# Patient Record
Sex: MALE | Race: BLACK OR AFRICAN AMERICAN | NOT HISPANIC OR LATINO
[De-identification: names, ages, dates, MRNs, and addresses within clinical notes are randomized per-mention and may not be internally consistent; named-entity substitution may affect disease eponyms.]

---

## 2017-05-24 PROBLEM — Z00.00 ENCOUNTER FOR PREVENTIVE HEALTH EXAMINATION: Status: ACTIVE | Noted: 2017-05-24

## 2017-06-26 ENCOUNTER — APPOINTMENT (OUTPATIENT)
Dept: UROLOGY | Facility: CLINIC | Age: 77
End: 2017-06-26

## 2021-01-09 ENCOUNTER — INPATIENT (INPATIENT)
Facility: HOSPITAL | Age: 81
LOS: 1 days | Discharge: HOME | DRG: 645 | End: 2021-01-11
Attending: HOSPITALIST | Admitting: HOSPITALIST
Payer: COMMERCIAL

## 2021-01-09 VITALS
WEIGHT: 160.06 LBS | RESPIRATION RATE: 17 BRPM | SYSTOLIC BLOOD PRESSURE: 166 MMHG | HEIGHT: 72 IN | TEMPERATURE: 98 F | OXYGEN SATURATION: 99 % | HEART RATE: 99 BPM | DIASTOLIC BLOOD PRESSURE: 80 MMHG

## 2021-01-09 DIAGNOSIS — E83.52 HYPERCALCEMIA: ICD-10-CM

## 2021-01-09 LAB
ALBUMIN SERPL ELPH-MCNC: 4 G/DL — SIGNIFICANT CHANGE UP (ref 3.3–5.2)
ALP SERPL-CCNC: 103 U/L — SIGNIFICANT CHANGE UP (ref 40–120)
ALT FLD-CCNC: 13 U/L — SIGNIFICANT CHANGE UP
ANION GAP SERPL CALC-SCNC: 10 MMOL/L — SIGNIFICANT CHANGE UP (ref 5–17)
ANION GAP SERPL CALC-SCNC: 9 MMOL/L — SIGNIFICANT CHANGE UP (ref 5–17)
AST SERPL-CCNC: 24 U/L — SIGNIFICANT CHANGE UP
BASOPHILS # BLD AUTO: 0.05 K/UL — SIGNIFICANT CHANGE UP (ref 0–0.2)
BASOPHILS NFR BLD AUTO: 0.7 % — SIGNIFICANT CHANGE UP (ref 0–2)
BILIRUB SERPL-MCNC: 0.5 MG/DL — SIGNIFICANT CHANGE UP (ref 0.4–2)
BUN SERPL-MCNC: 10 MG/DL — SIGNIFICANT CHANGE UP (ref 8–20)
BUN SERPL-MCNC: 8 MG/DL — SIGNIFICANT CHANGE UP (ref 8–20)
CA-I BLD-SCNC: 1.65 MMOL/L — HIGH (ref 1.15–1.33)
CALCIUM SERPL-MCNC: 11.5 MG/DL — HIGH (ref 8.6–10.2)
CALCIUM SERPL-MCNC: 12.9 MG/DL — HIGH (ref 8.6–10.2)
CHLORIDE SERPL-SCNC: 103 MMOL/L — SIGNIFICANT CHANGE UP (ref 98–107)
CHLORIDE SERPL-SCNC: 97 MMOL/L — LOW (ref 98–107)
CO2 SERPL-SCNC: 25 MMOL/L — SIGNIFICANT CHANGE UP (ref 22–29)
CO2 SERPL-SCNC: 26 MMOL/L — SIGNIFICANT CHANGE UP (ref 22–29)
CREAT SERPL-MCNC: 0.64 MG/DL — SIGNIFICANT CHANGE UP (ref 0.5–1.3)
CREAT SERPL-MCNC: 0.78 MG/DL — SIGNIFICANT CHANGE UP (ref 0.5–1.3)
EOSINOPHIL # BLD AUTO: 0.06 K/UL — SIGNIFICANT CHANGE UP (ref 0–0.5)
EOSINOPHIL NFR BLD AUTO: 0.8 % — SIGNIFICANT CHANGE UP (ref 0–6)
GLUCOSE SERPL-MCNC: 121 MG/DL — HIGH (ref 70–99)
GLUCOSE SERPL-MCNC: 79 MG/DL — SIGNIFICANT CHANGE UP (ref 70–99)
HCT VFR BLD CALC: 40.9 % — SIGNIFICANT CHANGE UP (ref 39–50)
HGB BLD-MCNC: 13.9 G/DL — SIGNIFICANT CHANGE UP (ref 13–17)
IMM GRANULOCYTES NFR BLD AUTO: 0.3 % — SIGNIFICANT CHANGE UP (ref 0–1.5)
LYMPHOCYTES # BLD AUTO: 2.41 K/UL — SIGNIFICANT CHANGE UP (ref 1–3.3)
LYMPHOCYTES # BLD AUTO: 31.5 % — SIGNIFICANT CHANGE UP (ref 13–44)
MAGNESIUM SERPL-MCNC: 1.4 MG/DL — LOW (ref 1.6–2.6)
MCHC RBC-ENTMCNC: 31 PG — SIGNIFICANT CHANGE UP (ref 27–34)
MCHC RBC-ENTMCNC: 34 GM/DL — SIGNIFICANT CHANGE UP (ref 32–36)
MCV RBC AUTO: 91.1 FL — SIGNIFICANT CHANGE UP (ref 80–100)
MONOCYTES # BLD AUTO: 0.69 K/UL — SIGNIFICANT CHANGE UP (ref 0–0.9)
MONOCYTES NFR BLD AUTO: 9 % — SIGNIFICANT CHANGE UP (ref 2–14)
NEUTROPHILS # BLD AUTO: 4.41 K/UL — SIGNIFICANT CHANGE UP (ref 1.8–7.4)
NEUTROPHILS NFR BLD AUTO: 57.7 % — SIGNIFICANT CHANGE UP (ref 43–77)
PHOSPHATE SERPL-MCNC: 1.7 MG/DL — LOW (ref 2.4–4.7)
PLATELET # BLD AUTO: 275 K/UL — SIGNIFICANT CHANGE UP (ref 150–400)
POTASSIUM SERPL-MCNC: 3.6 MMOL/L — SIGNIFICANT CHANGE UP (ref 3.5–5.3)
POTASSIUM SERPL-MCNC: 4 MMOL/L — SIGNIFICANT CHANGE UP (ref 3.5–5.3)
POTASSIUM SERPL-SCNC: 3.6 MMOL/L — SIGNIFICANT CHANGE UP (ref 3.5–5.3)
POTASSIUM SERPL-SCNC: 4 MMOL/L — SIGNIFICANT CHANGE UP (ref 3.5–5.3)
PROT SERPL-MCNC: 7.2 G/DL — SIGNIFICANT CHANGE UP (ref 6.6–8.7)
RBC # BLD: 4.49 M/UL — SIGNIFICANT CHANGE UP (ref 4.2–5.8)
RBC # FLD: 12.7 % — SIGNIFICANT CHANGE UP (ref 10.3–14.5)
SARS-COV-2 RNA SPEC QL NAA+PROBE: SIGNIFICANT CHANGE UP
SODIUM SERPL-SCNC: 131 MMOL/L — LOW (ref 135–145)
SODIUM SERPL-SCNC: 137 MMOL/L — SIGNIFICANT CHANGE UP (ref 135–145)
WBC # BLD: 7.64 K/UL — SIGNIFICANT CHANGE UP (ref 3.8–10.5)
WBC # FLD AUTO: 7.64 K/UL — SIGNIFICANT CHANGE UP (ref 3.8–10.5)

## 2021-01-09 PROCEDURE — 93010 ELECTROCARDIOGRAM REPORT: CPT

## 2021-01-09 PROCEDURE — 99222 1ST HOSP IP/OBS MODERATE 55: CPT

## 2021-01-09 PROCEDURE — 71045 X-RAY EXAM CHEST 1 VIEW: CPT | Mod: 26

## 2021-01-09 PROCEDURE — 99285 EMERGENCY DEPT VISIT HI MDM: CPT

## 2021-01-09 PROCEDURE — 99223 1ST HOSP IP/OBS HIGH 75: CPT

## 2021-01-09 RX ORDER — CALCITONIN SALMON 200 [IU]/ML
290 INJECTION, SOLUTION INTRAMUSCULAR EVERY 12 HOURS
Refills: 0 | Status: COMPLETED | OUTPATIENT
Start: 2021-01-09 | End: 2021-01-11

## 2021-01-09 RX ORDER — ENOXAPARIN SODIUM 100 MG/ML
40 INJECTION SUBCUTANEOUS DAILY
Refills: 0 | Status: DISCONTINUED | OUTPATIENT
Start: 2021-01-09 | End: 2021-01-11

## 2021-01-09 RX ORDER — MAGNESIUM SULFATE 500 MG/ML
2 VIAL (ML) INJECTION ONCE
Refills: 0 | Status: COMPLETED | OUTPATIENT
Start: 2021-01-09 | End: 2021-01-09

## 2021-01-09 RX ORDER — LOSARTAN POTASSIUM 100 MG/1
50 TABLET, FILM COATED ORAL DAILY
Refills: 0 | Status: DISCONTINUED | OUTPATIENT
Start: 2021-01-09 | End: 2021-01-11

## 2021-01-09 RX ORDER — SODIUM CHLORIDE 9 MG/ML
1000 INJECTION INTRAMUSCULAR; INTRAVENOUS; SUBCUTANEOUS
Refills: 0 | Status: DISCONTINUED | OUTPATIENT
Start: 2021-01-09 | End: 2021-01-11

## 2021-01-09 RX ORDER — CINACALCET 30 MG/1
30 TABLET, FILM COATED ORAL DAILY
Refills: 0 | Status: DISCONTINUED | OUTPATIENT
Start: 2021-01-09 | End: 2021-01-11

## 2021-01-09 RX ORDER — SIMVASTATIN 20 MG/1
20 TABLET, FILM COATED ORAL AT BEDTIME
Refills: 0 | Status: DISCONTINUED | OUTPATIENT
Start: 2021-01-09 | End: 2021-01-11

## 2021-01-09 RX ORDER — SODIUM CHLORIDE 9 MG/ML
1000 INJECTION INTRAMUSCULAR; INTRAVENOUS; SUBCUTANEOUS ONCE
Refills: 0 | Status: COMPLETED | OUTPATIENT
Start: 2021-01-09 | End: 2021-01-09

## 2021-01-09 RX ORDER — CINACALCET 30 MG/1
30 TABLET, FILM COATED ORAL ONCE
Refills: 0 | Status: COMPLETED | OUTPATIENT
Start: 2021-01-09 | End: 2021-01-09

## 2021-01-09 RX ORDER — AMLODIPINE BESYLATE 2.5 MG/1
5 TABLET ORAL DAILY
Refills: 0 | Status: DISCONTINUED | OUTPATIENT
Start: 2021-01-09 | End: 2021-01-11

## 2021-01-09 RX ADMIN — LOSARTAN POTASSIUM 50 MILLIGRAM(S): 100 TABLET, FILM COATED ORAL at 23:08

## 2021-01-09 RX ADMIN — Medication 50 GRAM(S): at 23:07

## 2021-01-09 RX ADMIN — SODIUM CHLORIDE 100 MILLILITER(S): 9 INJECTION INTRAMUSCULAR; INTRAVENOUS; SUBCUTANEOUS at 19:04

## 2021-01-09 RX ADMIN — SODIUM CHLORIDE 1000 MILLILITER(S): 9 INJECTION INTRAMUSCULAR; INTRAVENOUS; SUBCUTANEOUS at 18:44

## 2021-01-09 RX ADMIN — CINACALCET 30 MILLIGRAM(S): 30 TABLET, FILM COATED ORAL at 18:57

## 2021-01-09 RX ADMIN — CALCITONIN SALMON 290 INTERNATIONAL UNIT(S): 200 INJECTION, SOLUTION INTRAMUSCULAR at 18:56

## 2021-01-09 NOTE — H&P ADULT - NSICDXPASTSURGICALHX_GEN_ALL_CORE_FT
PAST SURGICAL HISTORY:  Benign Lipomatous Tumor right shoulder    S/P Inguinal Hernia Repair right    S/P Tonsillectomy

## 2021-01-09 NOTE — H&P ADULT - HISTORY OF PRESENT ILLNESS
79yo M with  pmh of HTN, HLD sent in from his endocrinologist office for hypercalcemia 13.2 after having routine labs done. pt states he had elevated calcium for the past 3 years and takes cinacalcet 30 mg qd at home that was just recently started.  Had recent DXA scan that he report is normal. he does report to 10-12lb weight loss over past 8weeks which he contribute to diet modification. Denies bone pain, hx of bone fractures, family hx of hypercal. No cp, sob, palpitations, fever, chills, n/v, abdominal pian. In the Ed labs pertinent for calcium of 12.9, evaluated by Endocrinologist, started on calcitonin IM, s/p 1L NS and cinacalcet 30mg x 1 dose.

## 2021-01-09 NOTE — CONSULT NOTE ADULT - SUBJECTIVE AND OBJECTIVE BOX
HPI:      PAST MEDICAL & SURGICAL HISTORY:  Hypertension  Hyperlipidemia  S/P Tonsillectomy  S/P Inguinal Hernia Repair  right    Benign Lipomatous Tumor  right shoulder    FAMILY HISTORY:    SOCIAL HISTORY:    REVIEW OF SYSTEMS:  Constitutional: No fever, no chills, no change in weight  Eyes: No eye swelling,no  blurry vision, no redness, no loss of vision.  Neck: No neck pain, no change in voice.  Lungs: No shortness of breath, no wheezing, no cough  CV: No chest pain, no palpitations, no pain with walking.  GI: No nausea, no vomiting, no constipation, no diarrhea, no abdominal pain  : No urinary frequency, no blood in urine  Musculoskeletal: No muscle pain, no joint pain, no swelling.  Skin: No rash  Neurologic: No headaches, no weakness  Endocrine: No heat intolerance, no cold intolerance  Psych: No depression, no anxiety    MEDICATIONS  (STANDING):  sodium chloride 0.9% Bolus 1000 milliLiter(s) IV Bolus once    Allergies  No Known Allergies    PHYSICAL EXAM:  Vital Signs Last 24 Hrs  T(C): 36.6 (09 Jan 2021 13:15), Max: 36.6 (09 Jan 2021 13:15)  T(F): 97.9 (09 Jan 2021 13:15), Max: 97.9 (09 Jan 2021 13:15)  HR: 99 (09 Jan 2021 13:15) (99 - 99)  BP: 166/80 (09 Jan 2021 13:15) (166/80 - 166/80)  BP(mean): --  RR: 17 (09 Jan 2021 13:15) (17 - 17)  SpO2: 99% (09 Jan 2021 13:15) (99% - 99%)    General appearance: Well developed, well nourished.  Eyes: Pupils equal and reactive to light. EOM full. No exophthalmos.  Neck: Trachea midline. No thyroid enlargement.  Lungs: Normal respiratory excursion. Lungs clear.  CV: Regular cardiac rhythm. No murmur. Carotid and pedal pulses intact.  Abdomen: Soft, non tender, no organomegaly or mass.  Musculoskeletal: No cyanosis, clubbing, or edema. No pedal lesions.  Skin: Warm and moist. No rash. No acanthosis.  Neuro: Cranial nerves intact. Normal motor and sensory function. DTR's normal.  Psych: Normal affect, good judgement.    LABS:                        13.9   7.64  )-----------( 275      ( 09 Jan 2021 15:08 )             40.9     01-09    131<L>  |  97<L>  |  10.0  ----------------------------<  79  3.6   |  25.0  |  0.78    Ca    12.9<H>      09 Jan 2021 15:08  Mg     1.4     01-09    TPro  7.2  /  Alb  4.0  /  TBili  0.5  /  DBili  x   /  AST  24  /  ALT  13  /  AlkPhos  103  01-09      LIVER FUNCTIONS - ( 09 Jan 2021 15:08 )  Alb: 4.0 g/dL / Pro: 7.2 g/dL / ALK PHOS: 103 U/L / ALT: 13 U/L / AST: 24 U/L / GGT: x              HPI: PT 80 with PMhs of HTN and HLD and primary hyperpTh comes for hypercalcemia 13.2 after having routine labs done at his endocrinologist's office. He came and received bolus 1  of NS .   He had high calcium for 3 years and takes cinacalcet 30 mg qd at home. Had recent DXA scan and he does not know result. No fracture no family h/o hyperCa, no depression. No MVi supplements      PAST MEDICAL & SURGICAL HISTORY:  Hypertension  Hyperlipidemia  S/P Tonsillectomy  S/P Inguinal Hernia Repair  right    Benign Lipomatous Tumor  right shoulder    FAMILY HISTORY: no high calcium     SOCIAL HISTORY: yumiko jamshid, no tobacco, no drugs     REVIEW OF SYSTEMS:  Constitutional: No fever, no chills, no change in weight  Eyes: No eye swelling,no  blurry vision, no redness, no loss of vision.  Neck: No neck pain, no change in voice.  Lungs: No shortness of breath, no wheezing, no cough  CV: No chest pain, no palpitations, no pain with walking.  GI: No nausea, no vomiting, no constipation, no diarrhea, no abdominal pain  : No urinary frequency, no blood in urine  Musculoskeletal: No muscle pain, no joint pain, no swelling.  Skin: No rash  Neurologic: No headaches, no weakness  Endocrine: No heat intolerance, no cold intolerance  Psych: No depression, no anxiety    MEDICATIONS  (STANDING):  sodium chloride 0.9% Bolus 1000 milliLiter(s) IV Bolus once    Allergies  No Known Allergies    PHYSICAL EXAM:  Vital Signs Last 24 Hrs  T(C): 36.6 (09 Jan 2021 13:15), Max: 36.6 (09 Jan 2021 13:15)  T(F): 97.9 (09 Jan 2021 13:15), Max: 97.9 (09 Jan 2021 13:15)  HR: 99 (09 Jan 2021 13:15) (99 - 99)  BP: 166/80 (09 Jan 2021 13:15) (166/80 - 166/80)  BP(mean): --  RR: 17 (09 Jan 2021 13:15) (17 - 17)  SpO2: 99% (09 Jan 2021 13:15) (99% - 99%)    General appearance: Well developed, well nourished.  Eyes: Pupils equal and reactive to light. EOM full. No exophthalmos.  Neck: Trachea midline. No thyroid enlargement.  Lungs: Normal respiratory excursion. Lungs clear.  CV: Regular cardiac rhythm. No murmur. Carotid and pedal pulses intact.  Abdomen: Soft, non tender, no organomegaly or mass.  Musculoskeletal: No cyanosis, clubbing, or edema. No pedal lesions.  Skin: Warm and moist. No rash. No acanthosis.  Neuro: Cranial nerves intact. Normal motor and sensory function. DTR's normal.  Psych: Normal affect, good judgement.    LABS:                        13.9   7.64  )-----------( 275      ( 09 Jan 2021 15:08 )             40.9     01-09    131<L>  |  97<L>  |  10.0  ----------------------------<  79  3.6   |  25.0  |  0.78    Ca    12.9<H>      09 Jan 2021 15:08  Mg     1.4     01-09    TPro  7.2  /  Alb  4.0  /  TBili  0.5  /  DBili  x   /  AST  24  /  ALT  13  /  AlkPhos  103  01-09      LIVER FUNCTIONS - ( 09 Jan 2021 15:08 )  Alb: 4.0 g/dL / Pro: 7.2 g/dL / ALK PHOS: 103 U/L / ALT: 13 U/L / AST: 24 U/L / GGT: x

## 2021-01-09 NOTE — H&P ADULT - NSHPPHYSICALEXAM_GEN_ALL_CORE
T(C): 36.9 (01-09-21 @ 20:41), Max: 36.9 (01-09-21 @ 20:41)  HR: 81 (01-09-21 @ 20:41) (81 - 99)  BP: 171/82 (01-09-21 @ 20:41) (166/80 - 180/81)  RR: 20 (01-09-21 @ 20:41) (17 - 20)  SpO2: 98% (01-09-21 @ 20:41) (98% - 99%)    GENERAL: patient appears well, no acute distress, appropriate, pleasant  EYES: sclera clear, no exudates  ENMT: oropharynx clear without erythema, no exudates, moist mucous membranes  NECK: supple, soft, no thyromegaly noted  LUNGS: good air entry bilaterally, clear to auscultation, symmetric breath sounds, no wheezing or rhonchi appreciated  HEART: soft S1/S2, regular rate and rhythm, no murmurs noted, no lower extremity edema  GASTROINTESTINAL: abdomen is soft, nontender, nondistended, normoactive bowel sounds, no palpable masses  INTEGUMENT: good skin turgor, warm skin, appears well perfused  MUSCULOSKELETAL: no clubbing or cyanosis, no obvious deformity  NEUROLOGIC: awake, alert, oriented x3, good muscle tone in 4 extremities, no obvious sensory deficits  PSYCHIATRIC: mood is good, affect is congruent, linear and logical thought process  HEME/LYMPH: no palpable supraclavicular nodules, no obvious ecchymosis or petechiae

## 2021-01-09 NOTE — ED PROVIDER NOTE - OBJECTIVE STATEMENT
Pt is an 81 yo M sent in for hypercalcemia.  Pt states that he has had hypercalcemia for approx. 3 years.  Pt states that he has had gradually worsening levels. Pt states that he had recent blood work and was called by his doctor and told to come to the ER as it was too high at 13.2.  Pt states that he has had some fatigue and a recent 10 lb weight loss. no n/v. no diarrhea. no fever.

## 2021-01-09 NOTE — CONSULT NOTE ADULT - ASSESSMENT
80 yop male sent by his PCP for high serum calcium from office 13.2, now repeated in ER 12.9   He received 1000 mL bolus NS in ER     Hypercalcemia:   check cmp, PTHi, 25OHD, 1.25 diHOD, P to evaluate if this is  PTh-driven / nonPTH driven hyperpTH.   avoid any calcium supplements, stop all MVI   start IV hydration  cc/hr as tolerated by cardiac status   start calcitonin for 2 days 4u /KG   avoid HCTZ   check calcium in 6 hr  80 yop male sent by his PCP for high serum calcium from office 13.2, now repeated in ER 12.9   He received 1000 mL bolus NS in ER     Hypercalcemia:   check cmp, PTHi, 25OHD, 1.25 diHOD, P to evaluate if this is  PTh-driven / nonPTH driven hyperpTH.   avoid any calcium supplements, stop all MVI   start IV hydration  cc/hr as tolerated by cardiac status   continue cinacalcet 30 mg qd.   start calcitonin for 2 days 4u /KG   avoid HCTZ   check calcium in 6 hr   he sees dr Pizarro as outpatient. He     HTN : continue with home medication. Stop HCTz if patient takes it     HLD: continue statin

## 2021-01-09 NOTE — ED STATDOCS - PROGRESS NOTE DETAILS
Tran GONZALEZ for ED attending, Dr. Villarreal. 79 y/o male with PMHx of Vitamin D deficiency, HLD and HTN presents to ED c/o abnormal lab result. Patient reports he was told he had an elevated calcium level of 13.2 by his PMD. 4 weeks ago, patient was referred to an endocrinologist for the hypercalcemia and was placed on Canacalcet. Patient is currently asymptomatic at this time. Patient also endorses about a 10lb weight loss over the past 4-6 weeks. PMD: Dr. Johnson ([place]). Focused eval, protocol orders entered. Pt to be moved to main ED for further evaluation by another provider.

## 2021-01-09 NOTE — ED ADULT NURSE NOTE - OBJECTIVE STATEMENT
Pt sent in for elevate calcium levels. Pt states each year that he gets a physical it is elevated. Offers no complaints.

## 2021-01-09 NOTE — ED PROVIDER NOTE - CLINICAL SUMMARY MEDICAL DECISION MAKING FREE TEXT BOX
labs and imaging reviewed.  patient seen by Dr. Mcneil and recommendations are in her note. will admit to medicine for further management.

## 2021-01-09 NOTE — ED ADULT NURSE NOTE - CHPI ED NUR SYMPTOMS NEG
no back pain/no chills/no decreased eating/drinking/no dizziness/no fever/no headache/no loss of consciousness/no nausea/no pain/no vomiting

## 2021-01-10 LAB
ANION GAP SERPL CALC-SCNC: 7 MMOL/L — SIGNIFICANT CHANGE UP (ref 5–17)
BUN SERPL-MCNC: 9 MG/DL — SIGNIFICANT CHANGE UP (ref 8–20)
CALCIUM SERPL-MCNC: 10.5 MG/DL — HIGH (ref 8.6–10.2)
CALCIUM SERPL-MCNC: 12.6 MG/DL — HIGH (ref 8.4–10.5)
CHLORIDE SERPL-SCNC: 103 MMOL/L — SIGNIFICANT CHANGE UP (ref 98–107)
CO2 SERPL-SCNC: 25 MMOL/L — SIGNIFICANT CHANGE UP (ref 22–29)
CREAT SERPL-MCNC: 0.65 MG/DL — SIGNIFICANT CHANGE UP (ref 0.5–1.3)
GLUCOSE SERPL-MCNC: 98 MG/DL — SIGNIFICANT CHANGE UP (ref 70–99)
POTASSIUM SERPL-MCNC: 3.8 MMOL/L — SIGNIFICANT CHANGE UP (ref 3.5–5.3)
POTASSIUM SERPL-SCNC: 3.8 MMOL/L — SIGNIFICANT CHANGE UP (ref 3.5–5.3)
PTH-INTACT FLD-MCNC: 194 PG/ML — HIGH (ref 15–65)
SARS-COV-2 IGG SERPL QL IA: NEGATIVE — SIGNIFICANT CHANGE UP
SARS-COV-2 IGM SERPL IA-ACNC: 0.07 INDEX — SIGNIFICANT CHANGE UP
SODIUM SERPL-SCNC: 135 MMOL/L — SIGNIFICANT CHANGE UP (ref 135–145)
VIT D25+D1,25 OH+D1,25 PNL SERPL-MCNC: 74.2 PG/ML — SIGNIFICANT CHANGE UP (ref 19.9–79.3)

## 2021-01-10 PROCEDURE — 99232 SBSQ HOSP IP/OBS MODERATE 35: CPT

## 2021-01-10 PROCEDURE — 76536 US EXAM OF HEAD AND NECK: CPT | Mod: 26

## 2021-01-10 PROCEDURE — 99233 SBSQ HOSP IP/OBS HIGH 50: CPT

## 2021-01-10 RX ORDER — SODIUM,POTASSIUM PHOSPHATES 278-250MG
1 POWDER IN PACKET (EA) ORAL
Refills: 0 | Status: DISCONTINUED | OUTPATIENT
Start: 2021-01-10 | End: 2021-01-11

## 2021-01-10 RX ADMIN — AMLODIPINE BESYLATE 5 MILLIGRAM(S): 2.5 TABLET ORAL at 04:59

## 2021-01-10 RX ADMIN — Medication 1 TABLET(S): at 18:27

## 2021-01-10 RX ADMIN — SODIUM CHLORIDE 100 MILLILITER(S): 9 INJECTION INTRAMUSCULAR; INTRAVENOUS; SUBCUTANEOUS at 23:04

## 2021-01-10 RX ADMIN — CINACALCET 30 MILLIGRAM(S): 30 TABLET, FILM COATED ORAL at 10:44

## 2021-01-10 RX ADMIN — SIMVASTATIN 20 MILLIGRAM(S): 20 TABLET, FILM COATED ORAL at 22:57

## 2021-01-10 RX ADMIN — ENOXAPARIN SODIUM 40 MILLIGRAM(S): 100 INJECTION SUBCUTANEOUS at 10:44

## 2021-01-10 RX ADMIN — CALCITONIN SALMON 290 INTERNATIONAL UNIT(S): 200 INJECTION, SOLUTION INTRAMUSCULAR at 18:27

## 2021-01-10 RX ADMIN — Medication 1 TABLET(S): at 22:57

## 2021-01-10 RX ADMIN — CALCITONIN SALMON 290 INTERNATIONAL UNIT(S): 200 INJECTION, SOLUTION INTRAMUSCULAR at 04:58

## 2021-01-10 NOTE — PROGRESS NOTE ADULT - SUBJECTIVE AND OBJECTIVE BOX
TAMAR CELESTE    [id number]    80y      Male    Patient is a 80y old  Male who presents with a chief complaint of Hypercalcemia (09 Jan 2021 21:04)      INTERVAL HPI/OVERNIGHT EVENTS:    Patient is doing well, he has no chest pain, sob, dizziness, fever, chills.     REVIEW OF SYSTEMS:    CONSTITUTIONAL: No fever, some fatigue  RESPIRATORY: No cough, No shortness of breath  CARDIOVASCULAR: No chest pain, palpitations  GASTROINTESTINAL: No abdominal, No nausea, vomiting  NEUROLOGICAL: No headaches,  loss of strength.  MISCELLANEOUS: No joint swelling or pain       Vital Signs Last 24 Hrs  T(C): 36.8 (10 Collin 2021 07:56), Max: 36.9 (09 Jan 2021 20:41)  T(F): 98.3 (10 Collin 2021 07:56), Max: 98.4 (09 Jan 2021 20:41)  HR: 80 (10 Collin 2021 07:56) (75 - 87)  BP: 148/62 (10 Collin 2021 07:56) (143/67 - 180/81)  RR: 20 (10 Collin 2021 07:56) (17 - 20)  SpO2: 99% (10 Collin 2021 07:56) (98% - 99%)    PHYSICAL EXAM:    GENERAL: Elderly  male looking comfortable   HEENT: PERRL  NECK: soft, Supple, No JVD,   CHEST/LUNG: Clear to auscultate bilaterally; No wheezing  HEART: S1S2+, Regular rate and rhythm; No murmurs  ABDOMEN: Soft, Nontender, Nondistended; Bowel sounds present  EXTREMITIES:  1+ Peripheral Pulses, No edema  SKIN: No rashes or lesions  NEURO: AAOX3, no focal deficits, no motor r sensory loss  PSYCH: normal mood      LABS:                        13.9   7.64  )-----------( 275      ( 09 Jan 2021 15:08 )             40.9     01-09    137  |  103  |  8.0  ----------------------------<  121<H>  4.0   |  26.0  |  0.64    Ca    11.5<H>      09 Jan 2021 23:31  Phos  1.7     01-09  Mg     1.4     01-09    TPro  7.2  /  Alb  4.0  /  TBili  0.5  /  DBili  x   /  AST  24  /  ALT  13  /  AlkPhos  103  01-09            I&O's Summary    09 Jan 2021 07:01  -  10 Collin 2021 07:00  --------------------------------------------------------  IN: 800 mL / OUT: 0 mL / NET: 800 mL        MEDICATIONS  (STANDING):  amLODIPine   Tablet 5 milliGRAM(s) Oral daily  calcitonin Injectable 290 International Unit(s) IntraMuscular every 12 hours  cinacalcet 30 milliGRAM(s) Oral daily  enoxaparin Injectable 40 milliGRAM(s) SubCutaneous daily  losartan 50 milliGRAM(s) Oral daily  potassium phosphate / sodium phosphate Tablet (K-PHOS No. 2) 1 Tablet(s) Oral four times a day with meals  simvastatin 20 milliGRAM(s) Oral at bedtime  sodium chloride 0.9%. 1000 milliLiter(s) (100 mL/Hr) IV Continuous <Continuous>    MEDICATIONS  (PRN):        
INTERVAL HPI/OVERNIGHT EVENTS:  followup for hyperCa    MEDICATIONS  (STANDING):  amLODIPine   Tablet 5 milliGRAM(s) Oral daily  calcitonin Injectable 290 International Unit(s) IntraMuscular every 12 hours  cinacalcet 30 milliGRAM(s) Oral daily  enoxaparin Injectable 40 milliGRAM(s) SubCutaneous daily  losartan 50 milliGRAM(s) Oral daily  potassium phosphate / sodium phosphate Tablet (K-PHOS No. 2) 1 Tablet(s) Oral four times a day with meals  simvastatin 20 milliGRAM(s) Oral at bedtime  sodium chloride 0.9%. 1000 milliLiter(s) (100 mL/Hr) IV Continuous <Continuous>    Allergies  No Known Allergies    Review of systems: no CP, no SOB , no HA , no N/V    Vital Signs Last 24 Hrs  T(C): 36.8 (10 Collin 2021 07:56), Max: 36.9 (09 Jan 2021 20:41)  T(F): 98.3 (10 Collin 2021 07:56), Max: 98.4 (09 Jan 2021 20:41)  HR: 80 (10 Collin 2021 07:56) (75 - 87)  BP: 148/62 (10 Collin 2021 07:56) (143/67 - 180/81)  BP(mean): --  RR: 20 (10 Collin 2021 07:56) (17 - 20)  SpO2: 99% (10 Collin 2021 07:56) (98% - 99%)    General appearance: Well developed, well nourished.  Eyes: Pupils equal and reactive to light. EOM full.   Neck: Trachea midline. No thyroid enlargement.  Lungs: Normal respiratory excursion. Lungs clear.  CV: Regular cardiac rhythm. No murmur. Carotid and pedal pulses intact.  Abdomen: Soft, non tender, no organomegaly or mass.  Musculoskeletal: No cyanosis, clubbing, or edema. No pedal lesions.  Skin: Warm and moist. No rash. No acanthosis.  Neuro: Cranial nerves intact. Normal motor and sensory function. DTR's normal.  Psych: Normal affect, good judgement.      LABS:                        13.9   7.64  )-----------( 275      ( 09 Jan 2021 15:08 )             40.9     01-09    137  |  103  |  8.0  ----------------------------<  121<H>  4.0   |  26.0  |  0.64    Ca    11.5<H>      09 Jan 2021 23:31  Phos  1.7     01-09  Mg     1.4     01-09    TPro  7.2  /  Alb  4.0  /  TBili  0.5  /  DBili  x   /  AST  24  /  ALT  13  /  AlkPhos  103  01-09

## 2021-01-10 NOTE — PROGRESS NOTE ADULT - ASSESSMENT
79yo M with  pmh of HTN, HLD sent in from his endocrinologist office for hypercalcemia 13.2 after having routine labs done.    Hypercalcemia:   -parathyroid vs nonparathyroid medicated vs thiazide diuretic or from drinking lots of milk  -calcium 12.9 on presentation s/p 1L NS, cinacalcet 30mg x 1 dose, calcium is 12.5  -cont with calcitonin IM, cinacalcet 30mg daily and IVF hydration   -repeat calcium later today and tomorrow  -PTHi is up, 25OHD normal, phos and mag is low, being replaced  -avoid calcium supplement, multivit, hold HCTZ   will get US of thyroid and parathyroid will get TSH as well.   -Endocrine following he sees dr Ellis as outpatient.    HTN/HLD  -cont with Losartan 50mg and amlodipine 5mg daily   -hold hcTz   -cont with simvastatin    DVT ppx  -cont lovenox .

## 2021-01-10 NOTE — PROGRESS NOTE ADULT - ASSESSMENT
80 yop male sent by his PCP for high serum calcium from office 13.2, now repeated in ER 12.9   It is PTH-driven hyperparathyroidism so primary hyperPTH. Follows with Dr Ellis.    Hypercalcemia: improved slightly today 12.6.   avoid any calcium supplements, stop all MVI   continue  cc/hr as tolerated by cardiac status   continue cinacalcet 30 mg qd.   continue  calcitonin for 2 days 4u /KG .Can be stopped after 48 hr since it develops fast tachyphylaxis   avoid HCTZ   check TFts   check thyroid  US and consider paraT sestaMIBI.   if not a surgical candidate can increase cinacalcet      HTN : continue with home medication. Stop HCTz if patient takes it     HLD: continue statin  80 yop male sent by his PCP for high serum calcium from office 13.2, now repeated in ER 12.9   It is PTH-driven hyperparathyroidism so primary hyperPTH. Follows with Dr Ellis.    Hypercalcemia: improved slightly today 12.6.   avoid any calcium supplements, stop all MVI   continue  cc/hr as tolerated by cardiac status   continue cinacalcet 30 mg qd.   continue  calcitonin for 2 days 4u /KG .Can be stopped after 48 hr since it develops fast tachyphylaxis   avoid HCTZ   check TFts   check thyroid  US and consider paraT sestaMIBI.   pt reluctant for surgery. revisit if iamin shows localization .   If he still refuses consider increasing cinacalcet to 60 mg qd and recheck calcium      HTN : continue with home medication. Stop HCTz if patient takes it     HLD: continue statin

## 2021-01-11 ENCOUNTER — TRANSCRIPTION ENCOUNTER (OUTPATIENT)
Age: 81
End: 2021-01-11

## 2021-01-11 VITALS
TEMPERATURE: 98 F | SYSTOLIC BLOOD PRESSURE: 145 MMHG | OXYGEN SATURATION: 97 % | DIASTOLIC BLOOD PRESSURE: 68 MMHG | HEART RATE: 86 BPM | RESPIRATION RATE: 20 BRPM

## 2021-01-11 LAB
ALBUMIN SERPL ELPH-MCNC: 3.6 G/DL — SIGNIFICANT CHANGE UP (ref 3.3–5.2)
ALP SERPL-CCNC: 83 U/L — SIGNIFICANT CHANGE UP (ref 40–120)
ALT FLD-CCNC: 15 U/L — SIGNIFICANT CHANGE UP
ANION GAP SERPL CALC-SCNC: 8 MMOL/L — SIGNIFICANT CHANGE UP (ref 5–17)
AST SERPL-CCNC: 30 U/L — SIGNIFICANT CHANGE UP
BILIRUB SERPL-MCNC: 0.3 MG/DL — LOW (ref 0.4–2)
BUN SERPL-MCNC: 5 MG/DL — LOW (ref 8–20)
CALCIUM SERPL-MCNC: 10.6 MG/DL — HIGH (ref 8.6–10.2)
CHLORIDE SERPL-SCNC: 103 MMOL/L — SIGNIFICANT CHANGE UP (ref 98–107)
CO2 SERPL-SCNC: 22 MMOL/L — SIGNIFICANT CHANGE UP (ref 22–29)
CREAT SERPL-MCNC: 0.56 MG/DL — SIGNIFICANT CHANGE UP (ref 0.5–1.3)
GLUCOSE SERPL-MCNC: 103 MG/DL — HIGH (ref 70–99)
HCT VFR BLD CALC: 37 % — LOW (ref 39–50)
HGB BLD-MCNC: 12.5 G/DL — LOW (ref 13–17)
MAGNESIUM SERPL-MCNC: 1.3 MG/DL — LOW (ref 1.6–2.6)
MCHC RBC-ENTMCNC: 30.7 PG — SIGNIFICANT CHANGE UP (ref 27–34)
MCHC RBC-ENTMCNC: 33.8 GM/DL — SIGNIFICANT CHANGE UP (ref 32–36)
MCV RBC AUTO: 90.9 FL — SIGNIFICANT CHANGE UP (ref 80–100)
PHOSPHATE SERPL-MCNC: 1.8 MG/DL — LOW (ref 2.4–4.7)
PLATELET # BLD AUTO: 267 K/UL — SIGNIFICANT CHANGE UP (ref 150–400)
POTASSIUM SERPL-MCNC: 3.8 MMOL/L — SIGNIFICANT CHANGE UP (ref 3.5–5.3)
POTASSIUM SERPL-SCNC: 3.8 MMOL/L — SIGNIFICANT CHANGE UP (ref 3.5–5.3)
PROT SERPL-MCNC: 6.1 G/DL — LOW (ref 6.6–8.7)
RBC # BLD: 4.07 M/UL — LOW (ref 4.2–5.8)
RBC # FLD: 12.8 % — SIGNIFICANT CHANGE UP (ref 10.3–14.5)
SODIUM SERPL-SCNC: 133 MMOL/L — LOW (ref 135–145)
TSH SERPL-MCNC: 1.28 UIU/ML — SIGNIFICANT CHANGE UP (ref 0.27–4.2)
WBC # BLD: 8.12 K/UL — SIGNIFICANT CHANGE UP (ref 3.8–10.5)
WBC # FLD AUTO: 8.12 K/UL — SIGNIFICANT CHANGE UP (ref 3.8–10.5)

## 2021-01-11 PROCEDURE — 99239 HOSP IP/OBS DSCHRG MGMT >30: CPT

## 2021-01-11 RX ORDER — CINACALCET 30 MG/1
30 TABLET, FILM COATED ORAL ONCE
Refills: 0 | Status: COMPLETED | OUTPATIENT
Start: 2021-01-11 | End: 2021-01-11

## 2021-01-11 RX ORDER — MAGNESIUM OXIDE 400 MG ORAL TABLET 241.3 MG
1 TABLET ORAL
Qty: 6 | Refills: 0
Start: 2021-01-11 | End: 2021-01-13

## 2021-01-11 RX ORDER — CINACALCET 30 MG/1
1 TABLET, FILM COATED ORAL
Qty: 30 | Refills: 0
Start: 2021-01-11 | End: 2021-02-09

## 2021-01-11 RX ORDER — MAGNESIUM SULFATE 500 MG/ML
2 VIAL (ML) INJECTION ONCE
Refills: 0 | Status: COMPLETED | OUTPATIENT
Start: 2021-01-11 | End: 2021-01-11

## 2021-01-11 RX ADMIN — Medication 1 TABLET(S): at 08:48

## 2021-01-11 RX ADMIN — ENOXAPARIN SODIUM 40 MILLIGRAM(S): 100 INJECTION SUBCUTANEOUS at 11:19

## 2021-01-11 RX ADMIN — CINACALCET 30 MILLIGRAM(S): 30 TABLET, FILM COATED ORAL at 11:19

## 2021-01-11 RX ADMIN — CINACALCET 30 MILLIGRAM(S): 30 TABLET, FILM COATED ORAL at 14:56

## 2021-01-11 RX ADMIN — LOSARTAN POTASSIUM 50 MILLIGRAM(S): 100 TABLET, FILM COATED ORAL at 05:37

## 2021-01-11 RX ADMIN — Medication 50 GRAM(S): at 14:56

## 2021-01-11 RX ADMIN — Medication 1 TABLET(S): at 11:19

## 2021-01-11 RX ADMIN — AMLODIPINE BESYLATE 5 MILLIGRAM(S): 2.5 TABLET ORAL at 05:37

## 2021-01-11 RX ADMIN — CALCITONIN SALMON 290 INTERNATIONAL UNIT(S): 200 INJECTION, SOLUTION INTRAMUSCULAR at 05:37

## 2021-01-11 NOTE — DISCHARGE NOTE PROVIDER - NSDCMRMEDTOKEN_GEN_ALL_CORE_FT
amLODIPine 5 mg oral tablet: 1 tab(s) orally once a day  cinacalcet 60 mg oral tablet: 1 tab(s) orally once a day   losartan-hydrochlorothiazide 50mg-12.5mg oral tablet: 1 tab(s) orally once a day  Mag-Ox 400 oral tablet: 1 tab(s) orally 2 times a day   simvastatin 20 mg oral tablet: 1 tab(s) orally once a day (at bedtime)

## 2021-01-11 NOTE — DISCHARGE NOTE PROVIDER - NSDCCPCAREPLAN_GEN_ALL_CORE_FT
PRINCIPAL DISCHARGE DIAGNOSIS  Diagnosis: Hypercalcemia  Assessment and Plan of Treatment: improved , continue cinacalcet      SECONDARY DISCHARGE DIAGNOSES  Diagnosis: Hypomagnesemia  Assessment and Plan of Treatment: continue oral supplement    Diagnosis: Hypertension  Assessment and Plan of Treatment: continue home meds

## 2021-01-11 NOTE — DISCHARGE NOTE PROVIDER - HOSPITAL COURSE
79yo M with  pmh of HTN, HLD sent in from his endocrinologist office for hypercalcemia 13.2 after having routine labs done. pt states he had elevated calcium for the past 3 years and takes cinacalcet 30 mg qd at home that was just recently started.  Had recent DXA scan that he report is normal. he does report to 10-12lb weight loss over past 8weeks which he contribute to diet modification. Denies bone pain, hx of bone fractures, family hx of hypercal. No cp, sob, palpitations, fever, chills, n/v, abdominal pian. In the Ed labs pertinent for calcium of 12.9, evaluated by Endocrinologist, started on calcitonin IM, s/p 1L NS and cinacalcet 30mg x 1 dose.  He continued     cc/hr as tolerated by cardiac status   continue cinacalcet 30 mg qd and calcitonin for 2 days 4u /KG, beinmg followed ny endocrin daily , ca levl is improving down to 10.6 , low magnesium level iv supplement ordered     He had thyrpoid sonogram and showed tiny R thyroid nodule , d/w endocrinology rec to discharge today up cinacelt to 60 mg daily and follow up with endocrinology frequently and to consider ENT evaluation for possible parathroidectomy     pt is informed verbally about above   total time spend coordinating care 35 min

## 2021-01-11 NOTE — DISCHARGE NOTE PROVIDER - NSDCPNSUBOBJ_GEN_ALL_CORE
pt is seen in am ,he is feeling well  wants to go home   no events overnight   repeat labs noted ca level improvibg   vitals stable   PE unremarkable normal     d/w endocrinologist will discharge home with higher dose of citracalcet   see private endcironologist will be called By Tim Devine   to consider parathtyrpid gland surgery

## 2021-01-11 NOTE — DISCHARGE NOTE NURSING/CASE MANAGEMENT/SOCIAL WORK - PATIENT PORTAL LINK FT
You can access the FollowMyHealth Patient Portal offered by [facility] by registering at the following website: [email or web address]. By joining [facility]’s FollowMyHealth portal, you will also be able to view your health information using other applications (apps) compatible with our system.

## 2021-01-21 PROCEDURE — 96372 THER/PROPH/DIAG INJ SC/IM: CPT

## 2021-01-21 PROCEDURE — 36415 COLL VENOUS BLD VENIPUNCTURE: CPT

## 2021-01-21 PROCEDURE — 85025 COMPLETE CBC W/AUTO DIFF WBC: CPT

## 2021-01-21 PROCEDURE — 84443 ASSAY THYROID STIM HORMONE: CPT

## 2021-01-21 PROCEDURE — 82652 VIT D 1 25-DIHYDROXY: CPT

## 2021-01-21 PROCEDURE — U0005: CPT

## 2021-01-21 PROCEDURE — 85027 COMPLETE CBC AUTOMATED: CPT

## 2021-01-21 PROCEDURE — 82330 ASSAY OF CALCIUM: CPT

## 2021-01-21 PROCEDURE — 82310 ASSAY OF CALCIUM: CPT

## 2021-01-21 PROCEDURE — 99285 EMERGENCY DEPT VISIT HI MDM: CPT | Mod: 25

## 2021-01-21 PROCEDURE — 80048 BASIC METABOLIC PNL TOTAL CA: CPT

## 2021-01-21 PROCEDURE — 93005 ELECTROCARDIOGRAM TRACING: CPT

## 2021-01-21 PROCEDURE — U0003: CPT

## 2021-01-21 PROCEDURE — 83970 ASSAY OF PARATHORMONE: CPT

## 2021-01-21 PROCEDURE — 83735 ASSAY OF MAGNESIUM: CPT

## 2021-01-21 PROCEDURE — 76536 US EXAM OF HEAD AND NECK: CPT

## 2021-01-21 PROCEDURE — 80053 COMPREHEN METABOLIC PANEL: CPT

## 2021-01-21 PROCEDURE — 71045 X-RAY EXAM CHEST 1 VIEW: CPT

## 2021-01-21 PROCEDURE — 86769 SARS-COV-2 COVID-19 ANTIBODY: CPT

## 2021-01-21 PROCEDURE — 84100 ASSAY OF PHOSPHORUS: CPT

## 2021-08-23 ENCOUNTER — INPATIENT (INPATIENT)
Facility: HOSPITAL | Age: 81
LOS: 5 days | Discharge: HOME | DRG: 643 | End: 2021-08-29
Attending: INTERNAL MEDICINE | Admitting: HOSPITALIST
Payer: COMMERCIAL

## 2021-08-23 VITALS
SYSTOLIC BLOOD PRESSURE: 170 MMHG | RESPIRATION RATE: 17 BRPM | OXYGEN SATURATION: 98 % | DIASTOLIC BLOOD PRESSURE: 86 MMHG | TEMPERATURE: 99 F | HEART RATE: 75 BPM | HEIGHT: 72 IN

## 2021-08-23 LAB
BASOPHILS # BLD AUTO: 0.05 K/UL — SIGNIFICANT CHANGE UP (ref 0–0.2)
BASOPHILS NFR BLD AUTO: 0.7 % — SIGNIFICANT CHANGE UP (ref 0–2)
EOSINOPHIL # BLD AUTO: 0.09 K/UL — SIGNIFICANT CHANGE UP (ref 0–0.5)
EOSINOPHIL NFR BLD AUTO: 1.2 % — SIGNIFICANT CHANGE UP (ref 0–6)
HCT VFR BLD CALC: 33.2 % — LOW (ref 39–50)
HGB BLD-MCNC: 11.2 G/DL — LOW (ref 13–17)
IMM GRANULOCYTES NFR BLD AUTO: 0.3 % — SIGNIFICANT CHANGE UP (ref 0–1.5)
LYMPHOCYTES # BLD AUTO: 2.51 K/UL — SIGNIFICANT CHANGE UP (ref 1–3.3)
LYMPHOCYTES # BLD AUTO: 33 % — SIGNIFICANT CHANGE UP (ref 13–44)
MCHC RBC-ENTMCNC: 30.3 PG — SIGNIFICANT CHANGE UP (ref 27–34)
MCHC RBC-ENTMCNC: 33.7 GM/DL — SIGNIFICANT CHANGE UP (ref 32–36)
MCV RBC AUTO: 89.7 FL — SIGNIFICANT CHANGE UP (ref 80–100)
MONOCYTES # BLD AUTO: 0.7 K/UL — SIGNIFICANT CHANGE UP (ref 0–0.9)
MONOCYTES NFR BLD AUTO: 9.2 % — SIGNIFICANT CHANGE UP (ref 2–14)
NEUTROPHILS # BLD AUTO: 4.24 K/UL — SIGNIFICANT CHANGE UP (ref 1.8–7.4)
NEUTROPHILS NFR BLD AUTO: 55.6 % — SIGNIFICANT CHANGE UP (ref 43–77)
PLATELET # BLD AUTO: 247 K/UL — SIGNIFICANT CHANGE UP (ref 150–400)
RBC # BLD: 3.7 M/UL — LOW (ref 4.2–5.8)
RBC # FLD: 13.8 % — SIGNIFICANT CHANGE UP (ref 10.3–14.5)
TSH SERPL-MCNC: 1.47 UIU/ML — SIGNIFICANT CHANGE UP (ref 0.27–4.2)
WBC # BLD: 7.61 K/UL — SIGNIFICANT CHANGE UP (ref 3.8–10.5)
WBC # FLD AUTO: 7.61 K/UL — SIGNIFICANT CHANGE UP (ref 3.8–10.5)

## 2021-08-23 PROCEDURE — 99285 EMERGENCY DEPT VISIT HI MDM: CPT

## 2021-08-23 RX ORDER — SODIUM CHLORIDE 9 MG/ML
2000 INJECTION, SOLUTION INTRAVENOUS ONCE
Refills: 0 | Status: COMPLETED | OUTPATIENT
Start: 2021-08-23 | End: 2021-08-23

## 2021-08-23 RX ADMIN — SODIUM CHLORIDE 2000 MILLILITER(S): 9 INJECTION, SOLUTION INTRAVENOUS at 23:26

## 2021-08-23 NOTE — ED PROVIDER NOTE - CLINICAL SUMMARY MEDICAL DECISION MAKING FREE TEXT BOX
Pt with hx of hyperparathyroidism sent in by PMD for Calcium of 13, no chest pain, no abdominal pain, no achiness. Will get repeat blood work 2L of IV fluid and will need admission for hypercalcemia.

## 2021-08-23 NOTE — ED PROVIDER NOTE - OBJECTIVE STATEMENT
82 y/o male with PMHx of HTN, HLD, hypercalcemia, hyperparathyroidism p/w hypercalcemia Pt was here in January with hypercalcemia, was advised to have parathyroid removed by endocrinologist. Pt notes significant weight loss. Pt fully vaccinated against Covid. Pt denies fevers, chest pain, difficulty breathing.   End: Dr. Sue [address] [facility].

## 2021-08-23 NOTE — ED PROVIDER NOTE - NS ED ROS FT
Review of Systems  •	CONSTITUTIONAL - no  fever, no diaphoresis, + weight loss   •	SKIN - no rash  •	HEMATOLOGIC - no bleeding, no bruising  •	EYES - no eye pain, no blurred vision  •	ENT - no change in hearing, no pain  •	RESPIRATORY - no shortness of breath, no cough  •	CARDIAC - no chest pain, no palpitations  •	GI - no abd pain, no nausea, no vomiting, no diarrhea, no constipation, no bleeding  •	GENITO-URINARY - no discharge, no dysuria; no hematuria,   •	ENDO - no polydipsia, no polyuria, no heat/no cold intolerance  •	MUSCULOSKELETAL - no joint pain, no swelling, no redness  •	NEUROLOGIC - no weakness, no headache, no anesthesia, no paresthesias  •	PSYCH - no anxiety, non suicidal, non homicidal, no hallucination, no depression

## 2021-08-23 NOTE — ED PROVIDER NOTE - SCRIBE NAME
Controlled Substance Refill Request for Adderall  Problem List Complete:  Yes     Last Written Prescription Date:  9/28/20  Last Fill Quantity: 60,   # refills: 0     [place] [facility] [place]13[place]20     Controlled substance agreement:   Encounter-Level CSA:    There are no encounter-level csa.      Patient-Level CSA:    Controlled Substance Agreement - Non - Opioid - Scan on 11/5/2019  5:52 PM: NON-OPIOID CONTROLLED SUBSTANCE AGREEMENT      Last Urine Drug Screen: No results found for: CDAUT, No results found for: COMDAT, No results found for: THC13, PCP13, COC13, MAMP13, OPI13, AMP13, BZO13, TCA13, MTD13, BAR13, OXY13, PPX13, BUP13     [email or web address]      checked in past 3 months?  Yes 8/3/20, no concerns      Asiya SOLN, RN     Yfn Camilo

## 2021-08-23 NOTE — ED PROVIDER NOTE - NSICDXPASTMEDICALHX_GEN_ALL_CORE_FT
PAST MEDICAL HISTORY:  History of hypercalcemia     Hyperlipidemia     Hypertension       Hyperparathyroidism

## 2021-08-23 NOTE — ED PROVIDER NOTE - PHYSICAL EXAMINATION
VITAL SIGNS: I have reviewed nursing notes and confirm.  CONSTITUTIONAL:  in no acute distress. cachectic   SKIN: Skin exam is warm and dry, no acute rash.  HEAD: Normocephalic; atraumatic.  EYES: PERRL, EOM intact; conjunctiva and sclera clear.  ENT: No nasal discharge; airway clear. Throat clear.  NECK: Supple; non tender.    CARD: Regular rate and rhythm.  RESP: No wheezes,  no rales or rhonchi.   ABD:  soft; non-distended; non-tender;   EXT: Normal ROM. No clubbing, cyanosis or edema.  NEURO: Alert, oriented. Grossly unremarkable. No focal deficits.  moves all extremities,  normal gait   PSYCH: Cooperative, appropriate.

## 2021-08-23 NOTE — ED ADULT NURSE NOTE - OBJECTIVE STATEMENT
Patient A&Ox4, denies any pain or discomfort. Cardiac monitor in place. Stated sent by MD for elevated calcium.

## 2021-08-24 DIAGNOSIS — E83.52 HYPERCALCEMIA: ICD-10-CM

## 2021-08-24 PROBLEM — Z86.39 PERSONAL HISTORY OF OTHER ENDOCRINE, NUTRITIONAL AND METABOLIC DISEASE: Chronic | Status: ACTIVE | Noted: 2021-01-09

## 2021-08-24 LAB
24R-OH-CALCIDIOL SERPL-MCNC: 26.8 NG/ML — LOW (ref 30–80)
ALBUMIN SERPL ELPH-MCNC: 3.9 G/DL — SIGNIFICANT CHANGE UP (ref 3.3–5.2)
ALP SERPL-CCNC: 99 U/L — SIGNIFICANT CHANGE UP (ref 40–120)
ALT FLD-CCNC: 12 U/L — SIGNIFICANT CHANGE UP
ANION GAP SERPL CALC-SCNC: 13 MMOL/L — SIGNIFICANT CHANGE UP (ref 5–17)
AST SERPL-CCNC: 21 U/L — SIGNIFICANT CHANGE UP
BILIRUB SERPL-MCNC: 0.3 MG/DL — LOW (ref 0.4–2)
BUN SERPL-MCNC: 8.8 MG/DL — SIGNIFICANT CHANGE UP (ref 8–20)
CA-I BLD-SCNC: 1.82 MMOL/L — CRITICAL HIGH (ref 1.15–1.33)
CALCIUM SERPL-MCNC: 12.5 MG/DL — HIGH (ref 8.6–10.2)
CALCIUM SERPL-MCNC: 13 MG/DL — CRITICAL HIGH (ref 8.6–10.2)
CALCIUM SERPL-MCNC: 13.1 MG/DL — CRITICAL HIGH (ref 8.4–10.5)
CALCIUM SERPL-MCNC: 13.2 MG/DL — CRITICAL HIGH (ref 8.6–10.2)
CHLORIDE SERPL-SCNC: 101 MMOL/L — SIGNIFICANT CHANGE UP (ref 98–107)
CO2 SERPL-SCNC: 24 MMOL/L — SIGNIFICANT CHANGE UP (ref 22–29)
CREAT SERPL-MCNC: 0.76 MG/DL — SIGNIFICANT CHANGE UP (ref 0.5–1.3)
GLUCOSE SERPL-MCNC: 83 MG/DL — SIGNIFICANT CHANGE UP (ref 70–99)
MAGNESIUM SERPL-MCNC: 1.5 MG/DL — LOW (ref 1.6–2.6)
PHOSPHATE SERPL-MCNC: 1.7 MG/DL — LOW (ref 2.4–4.7)
POTASSIUM SERPL-MCNC: 3.7 MMOL/L — SIGNIFICANT CHANGE UP (ref 3.5–5.3)
POTASSIUM SERPL-SCNC: 3.7 MMOL/L — SIGNIFICANT CHANGE UP (ref 3.5–5.3)
PROT SERPL-MCNC: 6.7 G/DL — SIGNIFICANT CHANGE UP (ref 6.6–8.7)
PTH-INTACT FLD-MCNC: 183 PG/ML — HIGH (ref 15–65)
SARS-COV-2 RNA SPEC QL NAA+PROBE: SIGNIFICANT CHANGE UP
SODIUM SERPL-SCNC: 138 MMOL/L — SIGNIFICANT CHANGE UP (ref 135–145)
VIT D25+D1,25 OH+D1,25 PNL SERPL-MCNC: 76.3 PG/ML — SIGNIFICANT CHANGE UP (ref 19.9–79.3)

## 2021-08-24 PROCEDURE — 99223 1ST HOSP IP/OBS HIGH 75: CPT

## 2021-08-24 RX ORDER — ONDANSETRON 8 MG/1
4 TABLET, FILM COATED ORAL EVERY 8 HOURS
Refills: 0 | Status: DISCONTINUED | OUTPATIENT
Start: 2021-08-24 | End: 2021-08-29

## 2021-08-24 RX ORDER — CALCITONIN SALMON 200 [IU]/ML
224 INJECTION, SOLUTION INTRAMUSCULAR EVERY 12 HOURS
Refills: 0 | Status: COMPLETED | OUTPATIENT
Start: 2021-08-24 | End: 2021-08-25

## 2021-08-24 RX ORDER — LANOLIN ALCOHOL/MO/W.PET/CERES
3 CREAM (GRAM) TOPICAL AT BEDTIME
Refills: 0 | Status: DISCONTINUED | OUTPATIENT
Start: 2021-08-24 | End: 2021-08-29

## 2021-08-24 RX ORDER — SIMVASTATIN 20 MG/1
20 TABLET, FILM COATED ORAL AT BEDTIME
Refills: 0 | Status: DISCONTINUED | OUTPATIENT
Start: 2021-08-24 | End: 2021-08-29

## 2021-08-24 RX ORDER — HEPARIN SODIUM 5000 [USP'U]/ML
5000 INJECTION INTRAVENOUS; SUBCUTANEOUS EVERY 8 HOURS
Refills: 0 | Status: DISCONTINUED | OUTPATIENT
Start: 2021-08-24 | End: 2021-08-29

## 2021-08-24 RX ORDER — MAGNESIUM OXIDE 400 MG ORAL TABLET 241.3 MG
400 TABLET ORAL DAILY
Refills: 0 | Status: DISCONTINUED | OUTPATIENT
Start: 2021-08-24 | End: 2021-08-29

## 2021-08-24 RX ORDER — SODIUM CHLORIDE 9 MG/ML
1000 INJECTION INTRAMUSCULAR; INTRAVENOUS; SUBCUTANEOUS
Refills: 0 | Status: DISCONTINUED | OUTPATIENT
Start: 2021-08-24 | End: 2021-08-27

## 2021-08-24 RX ORDER — CINACALCET 30 MG/1
60 TABLET, FILM COATED ORAL
Refills: 0 | Status: DISCONTINUED | OUTPATIENT
Start: 2021-08-24 | End: 2021-08-24

## 2021-08-24 RX ORDER — AMLODIPINE BESYLATE 2.5 MG/1
5 TABLET ORAL DAILY
Refills: 0 | Status: DISCONTINUED | OUTPATIENT
Start: 2021-08-24 | End: 2021-08-29

## 2021-08-24 RX ORDER — LOSARTAN POTASSIUM 100 MG/1
50 TABLET, FILM COATED ORAL DAILY
Refills: 0 | Status: DISCONTINUED | OUTPATIENT
Start: 2021-08-24 | End: 2021-08-29

## 2021-08-24 RX ORDER — ACETAMINOPHEN 500 MG
650 TABLET ORAL EVERY 6 HOURS
Refills: 0 | Status: DISCONTINUED | OUTPATIENT
Start: 2021-08-24 | End: 2021-08-29

## 2021-08-24 RX ORDER — CINACALCET 30 MG/1
90 TABLET, FILM COATED ORAL DAILY
Refills: 0 | Status: DISCONTINUED | OUTPATIENT
Start: 2021-08-24 | End: 2021-08-29

## 2021-08-24 RX ORDER — CINACALCET 30 MG/1
60 TABLET, FILM COATED ORAL DAILY
Refills: 0 | Status: DISCONTINUED | OUTPATIENT
Start: 2021-08-24 | End: 2021-08-24

## 2021-08-24 RX ADMIN — MAGNESIUM OXIDE 400 MG ORAL TABLET 400 MILLIGRAM(S): 241.3 TABLET ORAL at 12:17

## 2021-08-24 RX ADMIN — HEPARIN SODIUM 5000 UNIT(S): 5000 INJECTION INTRAVENOUS; SUBCUTANEOUS at 17:51

## 2021-08-24 RX ADMIN — CINACALCET 60 MILLIGRAM(S): 30 TABLET, FILM COATED ORAL at 12:17

## 2021-08-24 RX ADMIN — CALCITONIN SALMON 224 INTERNATIONAL UNIT(S): 200 INJECTION, SOLUTION INTRAMUSCULAR at 17:51

## 2021-08-24 RX ADMIN — AMLODIPINE BESYLATE 5 MILLIGRAM(S): 2.5 TABLET ORAL at 12:16

## 2021-08-24 RX ADMIN — SODIUM CHLORIDE 2000 MILLILITER(S): 9 INJECTION, SOLUTION INTRAVENOUS at 00:30

## 2021-08-24 RX ADMIN — SODIUM CHLORIDE 100 MILLILITER(S): 9 INJECTION INTRAMUSCULAR; INTRAVENOUS; SUBCUTANEOUS at 12:25

## 2021-08-24 RX ADMIN — SIMVASTATIN 20 MILLIGRAM(S): 20 TABLET, FILM COATED ORAL at 21:52

## 2021-08-24 RX ADMIN — LOSARTAN POTASSIUM 50 MILLIGRAM(S): 100 TABLET, FILM COATED ORAL at 12:17

## 2021-08-24 NOTE — CONSULT NOTE ADULT - SUBJECTIVE AND OBJECTIVE BOX
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    Eyes: No eye swelling no  blurry vision, no redness, no loss of vision.    Neck: No neck pain, no change in voice.    Lungs: No shortness of breath, no wheezing, no cough    CV: No chest pain, no palpitations, no pain with walking.    GI: No nausea, no vomiting, no constipation, no diarrhea, no abdominal pain    : No urinary frequency, no blood in urine, no urinary burning, no difficulty voiding.    Musculoskeletal: No muscle pain, no joint pain, no swelling.    Skin: No rash, no infections.    Neurologic: No headaches, no weakness, no burning or pain in feet, no tremor.    Endocrine: No heat intolerance, no cold intolerance, no increased sweating, no shakiness between meals.    Psych: No depression, no anxiety, no trouble concentrating    MEDICATIONS  (STANDING):  amLODIPine   Tablet 5 milliGRAM(s) Oral daily  calcitonin Injectable 224 International Unit(s) IntraMuscular every 12 hours  cinacalcet 60 milliGRAM(s) Oral daily  heparin   Injectable 5000 Unit(s) SubCutaneous every 8 hours  losartan 50 milliGRAM(s) Oral daily  magnesium oxide 400 milliGRAM(s) Oral daily  simvastatin 20 milliGRAM(s) Oral at bedtime  sodium chloride 0.9%. 1000 milliLiter(s) (100 mL/Hr) IV Continuous <Continuous>    MEDICATIONS  (PRN):  acetaminophen   Tablet .. 650 milliGRAM(s) Oral every 6 hours PRN Temp greater or equal to 38.5C (101.3F), Mild Pain (1 - 3)  aluminum hydroxide/magnesium hydroxide/simethicone Suspension 30 milliLiter(s) Oral every 4 hours PRN Dyspepsia  melatonin 3 milliGRAM(s) Oral at bedtime PRN Insomnia  ondansetron Injectable 4 milliGRAM(s) IV Push every 8 hours PRN Nausea and/or Vomiting      Allergies    No Known Allergies    Intolerances          CAPILLARY BLOOD GLUCOSE          PHYSICAL EXAM:    Vital Signs Last 24 Hrs  T(C): 36.6 (24 Aug 2021 15:32), Max: 36.7 (24 Aug 2021 08:38)  T(F): 97.8 (24 Aug 2021 15:32), Max: 98 (24 Aug 2021 08:38)  HR: 62 (24 Aug 2021 15:32) (53 - 78)  BP: 117/57 (24 Aug 2021 15:32) (115/60 - 162/89)  BP(mean): --  RR: 20 (24 Aug 2021 15:32) (20 - 20)  SpO2: 98% (24 Aug 2021 15:32) (97% - 100%)    General appearance: Well developed, well nourished.    Eyes: Pupils equal and reactive to light. EOM full. No exophthalmos.    Neck: Trachea midline. No thyroid enlargement.    Lungs: Normal respiratory excursion. Lungs clear.    CV: Regular cardiac rhythm. No murmur. Carotid and pedal pulses intact.      Musculoskeletal: No cyanosis, clubbing, or edema. No pedal lesions.    Skin: Warm and moist. No rash. No acanthosis.    Neuro: Cranial nerves intact. Normal motor and sensory function. DTR's normal.    Psych: Normal affect, good judgement.            LABS:                        11.2   7.61  )-----------( 247      ( 23 Aug 2021 23:18 )             33.2     08-23    138  |  101  |  8.8  ----------------------------<  83  3.7   |  24.0  |  0.76    Ca    12.5<H>      24 Aug 2021 17:38  Phos  1.7     08-23  Mg     1.5     08-23    TPro  6.7  /  Alb  3.9  /  TBili  0.3<L>  /  DBili  x   /  AST  21  /  ALT  12  /  AlkPhos  99  08-23      LIVER FUNCTIONS - ( 23 Aug 2021 23:18 )  Alb: 3.9 g/dL / Pro: 6.7 g/dL / ALK PHOS: 99 U/L / ALT: 12 U/L / AST: 21 U/L / GGT: x             Intact PTH: 183: PTH METHOD: Roche   Guide for Interpretation of PTH and Calcium Results   Calcium PTH   MG/DL PG/ML   Normal 8.4-10.5 15-65   Primary   Hyperparathyroidism >10.5 >50   Non-PTH Hypercalcemia >10.5 0-20   Hypoparathyroidism <8.4 0-20   Pseudohypoparathyroid <8.4 >50   This is intended as a guide only. Factors such as sunlight exposure,   Vitamin D status and renal function should be evaluated along with   clinical presentation. pg/mL (08.24.21 @ 03:44)       Historical Values  Intact PTH: 183: PTH METHOD: Roche   Guide for Interpretation of PTH and Calcium Results   Calcium PTH   MG/DL PG/ML   Normal 8.4-10.5 15-65   Primary   Hyperparathyroidism >10.5 >50   Non-PTH Hypercalcemia >10.5 0-20   Hypoparathyroidism <8.4 0-20   Pseudohypoparathyroid <8.4 >50   This is intended as a guide only. Factors such as sunlight exposure,   Vitamin D status and renal function should be evaluated along with < from: US Thyroid + Parathyroid (01.10.21 @ 18:32) >   EXAM:  US THYROID AND PARATHYROID                          PROCEDURE DATE:  01/10/2021          INTERPRETATION:  CLINICAL INFORMATION: Elevated calcium and parathyroid levels.    COMPARISON: None available.    TECHNIQUE: Sonography of the thyroid.    FINDINGS:  Right Lobe: 4.0 cm x 1.5 cm x  2.1 cm. Multiple nodules. For example:  *  Cyst with mural nodule in the upper pole, measuring 0.6 x 0.4 x 0.5 cm (TI-RAD 3)  *  Upper pole cyst, measuring 0.3 x 0.2 x 0.2 cm    Left Lobe: 3.0 cm x  1.2 cmx 1.0 cm. Normal echogenicity without focal lesion.    Isthmus: 4 mm.    Cervical Lymph Nodes: No enlarged or abnormal morphology cervical nodes.    IMPRESSION:    Tiny right thyroid nodules, for which no further ultrasound follow-up is required.            RACQUEL CANNON MD; Attending Radiologist  This document has been electronically signed. Collin 10 2021  7:41PM    < end of copied text >      CAPILLARY BLOOD GLUCOSE      RADIOLOGY & ADDITIONAL STUDIES:

## 2021-08-24 NOTE — H&P ADULT - NSHPPHYSICALEXAM_GEN_ALL_CORE
Vital Signs Last 24 Hrs  T(C): 36.6 (24 Aug 2021 03:33), Max: 37.1 (23 Aug 2021 20:19)  T(F): 97.9 (24 Aug 2021 03:33), Max: 98.7 (23 Aug 2021 20:19)  HR: 53 (24 Aug 2021 03:33) (53 - 75)  BP: 153/68 (24 Aug 2021 03:33) (153/68 - 170/86)  BP(mean): --  RR: 20 (24 Aug 2021 03:33) (17 - 20)  SpO2: 97% (24 Aug 2021 03:33) (97% - 98%)    Telemedicine precludes detailed physical examination  pt appears much younger than stated age  +bitemp wasting  urinating often in ED

## 2021-08-24 NOTE — H&P ADULT - TIME BILLING
On site hospitalist addendum:    Case discussed with tele-hospitalist.  Agree with above HPI and A/P as above with following brief addendum: 81yoM hx hyperparathyroid disease, HTN, HLD sent to hospital by PMD for hypercalcemia with admission Ca of about 13.  Pt started on regimen as above with IVF, cinacalcet, calcitonin and endocrine consulted by tele-hospitalist.  Calcitonin appears to have been cancelled by hospital pharmacy, please call and clarify why.  Serial Adeel level ordered to assess for calcium.    Vital Signs Last 24 Hrs  T(C): 36.6 (24 Aug 2021 03:33), Max: 37.1 (23 Aug 2021 20:19)  T(F): 97.9 (24 Aug 2021 03:33), Max: 98.7 (23 Aug 2021 20:19)  HR: 53 (24 Aug 2021 03:33) (53 - 75)  BP: 153/68 (24 Aug 2021 03:33) (153/68 - 170/86)  BP(mean): --  RR: 20 (24 Aug 2021 03:33) (17 - 20)  SpO2: 97% (24 Aug 2021 03:33) (97% - 98%)    GENERAL:  Well-appearing, not in acute distress  EYES:  Clear conjunctiva, extraocular movement intact  ENT: Moist mucous membranes  RESP:  Non-labored breathing pattern, lungs clear to ausculation  CV: Regular rate and rhythm, no murmurs appreciated, no lower extremity edema  GI: Soft, non-tender, non-distended  NEURO: Awake, alert, conversant, upper and lower extremity strength 5/5, light touch sensation grossly intact  PSYCH: Calm, cooperative  SKIN: No rash or lesions, warm and dry

## 2021-08-24 NOTE — INPATIENT CERTIFICATION FOR MEDICARE PATIENTS - RISKS OF ADVERSE EVENTS
Concern for cardiopulmonary deterioration/Concern for neurologic deterioration/Concern for delay in diagnosis and treatment/Concern for renal deterioration

## 2021-08-24 NOTE — CHART NOTE - NSCHARTNOTEFT_GEN_A_CORE
Pt seen and examined at bedside     - Awaiting Endocrinology input   - Hold HCTZ   - Rest of plan per H&P

## 2021-08-24 NOTE — H&P ADULT - NSICDXPASTMEDICALHX_GEN_ALL_CORE_FT
PAST MEDICAL HISTORY:  History of hypercalcemia     Hyperlipidemia     Hyperparathyroidism     Hypertension

## 2021-08-24 NOTE — H&P ADULT - ASSESSMENT
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 admit to telemetry  2. calcium q 6 hrs  3. s/p LR 2000 cc in ED  4. vitamin D 25 and 1,25 levels ordered  5. vitamin D supplements held  6. holding HCTZ 12.5  7. increased cinalcet 60 mg qd to BID  8. calcitonin  9. endocrine      #HLD  1. simvastatin 20 mg        #HTN  1. losartan 50 mg qd w parameters  2. amlodipine 5 mg qd w parameters      #Continued weight loss  1. nutrition consult      #VTE  IMPROVE VTE Individual Risk Assessment    RISK                                                                Points    [  ] Previous VTE                                                  3    [  ] Thrombophilia                                               2    [  ] Lower limb paralysis                                      2        (unable to hold up >15 seconds)      [  ] Current Cancer                                              2         (within 6 months)    [  ] Immobilization > 24 hrs                                1    [  ] ICU/CCU stay > 24 hours                              1    [ X ] Age > 60                                                      1    IMPROVE VTE Score _____1____    IMPROVE Score 0-1: Low Risk, No VTE prophylaxis required for most patients, encourage ambulation.   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 admit to telemetry  2. calcium q 6 hrs  3. s/p LR 2000 cc in ED,  cc/hr  4. vitamin D 25 and 1,25 levels ordered  5. vitamin D supplements held  6. holding HCTZ 12.5  7. cinalcet 60 mg qd   8. calcitonin at 4 units/kg  x 56 kg  9. endocrine   10. discussed need for surgical management    #Hypomagnesemia 1.5  1. mg ox 25 0 mg increased to 400 qd  2. may need Mg supplementation IV      #Weight loss  outpt CT reading pending  1. nutrition consult requested by pt      #HLD  1. simvastatin 20 mg      #HTN  1. losartan 50 mg qd w parameters  2. holding HCTZ  3. amlodipine 5 mg qd      #Continued weight loss  1. nutrition consult      #VTE  IMPROVE VTE Individual Risk Assessment    RISK                                                                Points    [  ] Previous VTE                                                  3    [  ] Thrombophilia                                               2    [  ] Lower limb paralysis                                      2        (unable to hold up >15 seconds)      [  ] Current Cancer                                              2         (within 6 months)    [  ] Immobilization > 24 hrs                                1    [  ] ICU/CCU stay > 24 hours                              1    [ X ] Age > 60                                                      1    IMPROVE VTE Score _____1____    IMPROVE Score 0-1: Low Risk, No VTE prophylaxis required for most patients, encourage ambulation.   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 admit to telemetry  2. calcium q 6 hrs w next check at 10:00  3. s/p LR 2000 cc in ED,  cc/hr  4. vitamin D 25 and 1,25 levels ordered  5. vitamin D supplements held  6. holding HCTZ 12.5  7. cinalcet 60 mg qd   8. calcitonin at 4 units/kg  x 56 kg = 224 units IM q 12 hrs  9. endocrine   10. discussed need for surgical management    #Hypomagnesemia 1.5  1. mg ox 25 0 mg increased to 400 qd  2. may need Mg supplementation IV      #Weight loss  outpt CT reading pending  1. nutrition consult requested by pt      #HLD  1. simvastatin 20 mg      #HTN  1. losartan 50 mg qd w parameters  2. holding HCTZ  3. amlodipine 5 mg qd      #Continued weight loss  1. nutrition consult      #VTE  IMPROVE VTE Individual Risk Assessment    RISK                                                                Points    [  ] Previous VTE                                                  3    [  ] Thrombophilia                                               2    [  ] Lower limb paralysis                                      2        (unable to hold up >15 seconds)      [  ] Current Cancer                                              2         (within 6 months)    [  ] Immobilization > 24 hrs                                1    [  ] ICU/CCU stay > 24 hours                              1    [ X ] Age > 60                                                      1    IMPROVE VTE Score _____1____    IMPROVE Score 0-1: Low Risk, No VTE prophylaxis required for most patients, encourage ambulation.   IMPROVE Score 2-3: At risk, pharmacologic VTE prophylaxis is indicated for most patients (in the absence of a contraindication)  IMPROVE Score > or = 4: High Risk, pharmacologic VTE prophylaxis is indicated for most patients (in the absence of a contraindication)      VTE  heparin 5000 units sq  med rec done w pt  endocrine consulted  [phone] at 06:38 for today    FOLLOW UP  exam  labs at 10:00, 16:00, 22:00

## 2021-08-24 NOTE — CONSULT NOTE ADULT - ASSESSMENT
Hypercalcemia due to Primary Hyperparathyroidsm  pt has been taking sensipar 30 bid as outpt - no w on 60 mg qd      will increase to 90 mg qd    ALso hyoercalcmeia may have been exacerbated as pt was on losartan HCT as outpt- HCTZ can increase serum calcium       Cont Current  calcitonin- Caclium now down to 12.5    Pt apparently had normal DEXA    SOno in Jan 2021 - small thryoid cyst      weight loss 50 lbs-  ? ? compenent of hyeprcalcemia of malignancy    CT scan done as outpt  L[facility]    given Hypercaclmeia- can check SPEP  UPEP    ACE level

## 2021-08-24 NOTE — H&P ADULT - HISTORY OF PRESENT ILLNESS
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, HLD, HTN was sent to ED by MD due to hypercalcemia      Pt presetned to [facility] in  w hypercalcemia and was advised to have gland removed  He preferred to have his endo monitor him on sensipar  Despite compliance w medication , Calcium was 13.2    pt c/o weakness  +weight loss 50-55 lbs over several months  PCP ordered CT abd/pelvis yesterday w results pending        PAST MEDICAL HISTORY:  Hypercalcemia   HLD hyperlipidemia   HTN hypertension   PHPTprimary hyperparathyroidism          PAST SURGICAL HX  Benign Lipomatous Tumor right shoulder  S/P Inguinal Hernia Repair right  S/P Tonsillectomy.       FAMILY HX  FH: hypertension.  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

## 2021-08-25 LAB
ANION GAP SERPL CALC-SCNC: 11 MMOL/L — SIGNIFICANT CHANGE UP (ref 5–17)
BASOPHILS # BLD AUTO: 0.06 K/UL — SIGNIFICANT CHANGE UP (ref 0–0.2)
BASOPHILS NFR BLD AUTO: 0.6 % — SIGNIFICANT CHANGE UP (ref 0–2)
BUN SERPL-MCNC: 7.5 MG/DL — LOW (ref 8–20)
CALCIUM SERPL-MCNC: 12.2 MG/DL — HIGH (ref 8.6–10.2)
CALCIUM SERPL-MCNC: 12.6 MG/DL — HIGH (ref 8.6–10.2)
CHLORIDE SERPL-SCNC: 102 MMOL/L — SIGNIFICANT CHANGE UP (ref 98–107)
CO2 SERPL-SCNC: 27 MMOL/L — SIGNIFICANT CHANGE UP (ref 22–29)
COVID-19 SPIKE DOMAIN AB INTERP: POSITIVE
COVID-19 SPIKE DOMAIN ANTIBODY RESULT: >250 U/ML — HIGH
CREAT SERPL-MCNC: 0.56 MG/DL — SIGNIFICANT CHANGE UP (ref 0.5–1.3)
EOSINOPHIL # BLD AUTO: 0.04 K/UL — SIGNIFICANT CHANGE UP (ref 0–0.5)
EOSINOPHIL NFR BLD AUTO: 0.4 % — SIGNIFICANT CHANGE UP (ref 0–6)
GLUCOSE SERPL-MCNC: 105 MG/DL — HIGH (ref 70–99)
HCT VFR BLD CALC: 38 % — LOW (ref 39–50)
HGB BLD-MCNC: 12.8 G/DL — LOW (ref 13–17)
IMM GRANULOCYTES NFR BLD AUTO: 0.4 % — SIGNIFICANT CHANGE UP (ref 0–1.5)
LYMPHOCYTES # BLD AUTO: 1.55 K/UL — SIGNIFICANT CHANGE UP (ref 1–3.3)
LYMPHOCYTES # BLD AUTO: 15.8 % — SIGNIFICANT CHANGE UP (ref 13–44)
MCHC RBC-ENTMCNC: 30.9 PG — SIGNIFICANT CHANGE UP (ref 27–34)
MCHC RBC-ENTMCNC: 33.7 GM/DL — SIGNIFICANT CHANGE UP (ref 32–36)
MCV RBC AUTO: 91.8 FL — SIGNIFICANT CHANGE UP (ref 80–100)
MONOCYTES # BLD AUTO: 0.64 K/UL — SIGNIFICANT CHANGE UP (ref 0–0.9)
MONOCYTES NFR BLD AUTO: 6.5 % — SIGNIFICANT CHANGE UP (ref 2–14)
NEUTROPHILS # BLD AUTO: 7.45 K/UL — HIGH (ref 1.8–7.4)
NEUTROPHILS NFR BLD AUTO: 76.3 % — SIGNIFICANT CHANGE UP (ref 43–77)
PLATELET # BLD AUTO: 306 K/UL — SIGNIFICANT CHANGE UP (ref 150–400)
POTASSIUM SERPL-MCNC: 4.4 MMOL/L — SIGNIFICANT CHANGE UP (ref 3.5–5.3)
POTASSIUM SERPL-SCNC: 4.4 MMOL/L — SIGNIFICANT CHANGE UP (ref 3.5–5.3)
PROT SERPL-MCNC: 6.5 G/DL — SIGNIFICANT CHANGE UP (ref 6–8.3)
PROT SERPL-MCNC: 6.5 G/DL — SIGNIFICANT CHANGE UP (ref 6–8.3)
RBC # BLD: 4.14 M/UL — LOW (ref 4.2–5.8)
RBC # FLD: 14 % — SIGNIFICANT CHANGE UP (ref 10.3–14.5)
SARS-COV-2 IGG+IGM SERPL QL IA: >250 U/ML — HIGH
SARS-COV-2 IGG+IGM SERPL QL IA: POSITIVE
SODIUM SERPL-SCNC: 140 MMOL/L — SIGNIFICANT CHANGE UP (ref 135–145)
WBC # BLD: 9.78 K/UL — SIGNIFICANT CHANGE UP (ref 3.8–10.5)
WBC # FLD AUTO: 9.78 K/UL — SIGNIFICANT CHANGE UP (ref 3.8–10.5)

## 2021-08-25 PROCEDURE — 99233 SBSQ HOSP IP/OBS HIGH 50: CPT

## 2021-08-25 RX ADMIN — LOSARTAN POTASSIUM 50 MILLIGRAM(S): 100 TABLET, FILM COATED ORAL at 06:50

## 2021-08-25 RX ADMIN — SIMVASTATIN 20 MILLIGRAM(S): 20 TABLET, FILM COATED ORAL at 23:13

## 2021-08-25 RX ADMIN — SODIUM CHLORIDE 100 MILLILITER(S): 9 INJECTION INTRAMUSCULAR; INTRAVENOUS; SUBCUTANEOUS at 07:00

## 2021-08-25 RX ADMIN — CALCITONIN SALMON 224 INTERNATIONAL UNIT(S): 200 INJECTION, SOLUTION INTRAMUSCULAR at 18:23

## 2021-08-25 RX ADMIN — HEPARIN SODIUM 5000 UNIT(S): 5000 INJECTION INTRAVENOUS; SUBCUTANEOUS at 23:13

## 2021-08-25 RX ADMIN — MAGNESIUM OXIDE 400 MG ORAL TABLET 400 MILLIGRAM(S): 241.3 TABLET ORAL at 15:20

## 2021-08-25 RX ADMIN — AMLODIPINE BESYLATE 5 MILLIGRAM(S): 2.5 TABLET ORAL at 06:50

## 2021-08-25 RX ADMIN — CINACALCET 90 MILLIGRAM(S): 30 TABLET, FILM COATED ORAL at 15:19

## 2021-08-25 RX ADMIN — SODIUM CHLORIDE 100 MILLILITER(S): 9 INJECTION INTRAMUSCULAR; INTRAVENOUS; SUBCUTANEOUS at 23:14

## 2021-08-25 RX ADMIN — SODIUM CHLORIDE 100 MILLILITER(S): 9 INJECTION INTRAMUSCULAR; INTRAVENOUS; SUBCUTANEOUS at 01:56

## 2021-08-25 RX ADMIN — HEPARIN SODIUM 5000 UNIT(S): 5000 INJECTION INTRAVENOUS; SUBCUTANEOUS at 15:20

## 2021-08-26 LAB
ACE SERPL-CCNC: 29 U/L — SIGNIFICANT CHANGE UP (ref 14–82)
ANION GAP SERPL CALC-SCNC: 5 MMOL/L — SIGNIFICANT CHANGE UP (ref 5–17)
BASOPHILS # BLD AUTO: 0.04 K/UL — SIGNIFICANT CHANGE UP (ref 0–0.2)
BASOPHILS NFR BLD AUTO: 0.6 % — SIGNIFICANT CHANGE UP (ref 0–2)
BUN SERPL-MCNC: 6.9 MG/DL — LOW (ref 8–20)
CALCIUM SERPL-MCNC: 10.2 MG/DL — SIGNIFICANT CHANGE UP (ref 8.6–10.2)
CHLORIDE SERPL-SCNC: 103 MMOL/L — SIGNIFICANT CHANGE UP (ref 98–107)
CO2 SERPL-SCNC: 27 MMOL/L — SIGNIFICANT CHANGE UP (ref 22–29)
COLLECT DURATION TIME UR: 24 HR — SIGNIFICANT CHANGE UP
CREAT SERPL-MCNC: 0.5 MG/DL — SIGNIFICANT CHANGE UP (ref 0.5–1.3)
EOSINOPHIL # BLD AUTO: 0.05 K/UL — SIGNIFICANT CHANGE UP (ref 0–0.5)
EOSINOPHIL NFR BLD AUTO: 0.7 % — SIGNIFICANT CHANGE UP (ref 0–6)
GLUCOSE BLDC GLUCOMTR-MCNC: 103 MG/DL — HIGH (ref 70–99)
GLUCOSE SERPL-MCNC: 81 MG/DL — SIGNIFICANT CHANGE UP (ref 70–99)
HCT VFR BLD CALC: 32.4 % — LOW (ref 39–50)
HGB BLD-MCNC: 11 G/DL — LOW (ref 13–17)
IGA FLD-MCNC: 150 MG/DL — SIGNIFICANT CHANGE UP (ref 84–499)
IGG FLD-MCNC: 908 MG/DL — SIGNIFICANT CHANGE UP (ref 610–1660)
IGM SERPL-MCNC: 110 MG/DL — SIGNIFICANT CHANGE UP (ref 35–242)
IMM GRANULOCYTES NFR BLD AUTO: 0.3 % — SIGNIFICANT CHANGE UP (ref 0–1.5)
KAPPA LC SER QL IFE: 5.98 MG/DL — HIGH (ref 0.33–1.94)
KAPPA/LAMBDA FREE LIGHT CHAIN RATIO, SERUM: 0.34 RATIO — SIGNIFICANT CHANGE UP (ref 0.26–1.65)
LAMBDA LC SER QL IFE: 17.5 MG/DL — HIGH (ref 0.57–2.63)
LYMPHOCYTES # BLD AUTO: 2.15 K/UL — SIGNIFICANT CHANGE UP (ref 1–3.3)
LYMPHOCYTES # BLD AUTO: 30.2 % — SIGNIFICANT CHANGE UP (ref 13–44)
MAGNESIUM SERPL-MCNC: 1.4 MG/DL — LOW (ref 1.6–2.6)
MCHC RBC-ENTMCNC: 30.1 PG — SIGNIFICANT CHANGE UP (ref 27–34)
MCHC RBC-ENTMCNC: 34 GM/DL — SIGNIFICANT CHANGE UP (ref 32–36)
MCV RBC AUTO: 88.8 FL — SIGNIFICANT CHANGE UP (ref 80–100)
MONOCYTES # BLD AUTO: 0.61 K/UL — SIGNIFICANT CHANGE UP (ref 0–0.9)
MONOCYTES NFR BLD AUTO: 8.6 % — SIGNIFICANT CHANGE UP (ref 2–14)
NEUTROPHILS # BLD AUTO: 4.25 K/UL — SIGNIFICANT CHANGE UP (ref 1.8–7.4)
NEUTROPHILS NFR BLD AUTO: 59.6 % — SIGNIFICANT CHANGE UP (ref 43–77)
PHOSPHATE SERPL-MCNC: 1.1 MG/DL — LOW (ref 2.4–4.7)
PLATELET # BLD AUTO: 262 K/UL — SIGNIFICANT CHANGE UP (ref 150–400)
POTASSIUM SERPL-MCNC: 4 MMOL/L — SIGNIFICANT CHANGE UP (ref 3.5–5.3)
POTASSIUM SERPL-SCNC: 4 MMOL/L — SIGNIFICANT CHANGE UP (ref 3.5–5.3)
PROT 24H UR-MRATE: 152 MG/24HR — HIGH (ref 50–100)
PSA FLD-MCNC: 8.53 NG/ML — HIGH (ref 0–4)
RBC # BLD: 3.65 M/UL — LOW (ref 4.2–5.8)
RBC # FLD: 14.2 % — SIGNIFICANT CHANGE UP (ref 10.3–14.5)
SODIUM SERPL-SCNC: 135 MMOL/L — SIGNIFICANT CHANGE UP (ref 135–145)
TOTAL VOLUME - 24 HOUR: 725 ML — SIGNIFICANT CHANGE UP
URINE CREATININE CALCULATION: 0.5 G/24 HR — LOW (ref 1–2)
URINE CREATININE CALCULATION: 0.5 G/24 HR — LOW (ref 1–2)
WBC # BLD: 7.12 K/UL — SIGNIFICANT CHANGE UP (ref 3.8–10.5)
WBC # FLD AUTO: 7.12 K/UL — SIGNIFICANT CHANGE UP (ref 3.8–10.5)

## 2021-08-26 PROCEDURE — 71250 CT THORAX DX C-: CPT | Mod: 26

## 2021-08-26 PROCEDURE — 99233 SBSQ HOSP IP/OBS HIGH 50: CPT

## 2021-08-26 PROCEDURE — 99232 SBSQ HOSP IP/OBS MODERATE 35: CPT

## 2021-08-26 RX ORDER — MAGNESIUM SULFATE 500 MG/ML
4 VIAL (ML) INJECTION ONCE
Refills: 0 | Status: COMPLETED | OUTPATIENT
Start: 2021-08-26 | End: 2021-08-26

## 2021-08-26 RX ADMIN — AMLODIPINE BESYLATE 5 MILLIGRAM(S): 2.5 TABLET ORAL at 05:24

## 2021-08-26 RX ADMIN — HEPARIN SODIUM 5000 UNIT(S): 5000 INJECTION INTRAVENOUS; SUBCUTANEOUS at 05:24

## 2021-08-26 RX ADMIN — Medication 100 GRAM(S): at 12:11

## 2021-08-26 RX ADMIN — SODIUM CHLORIDE 100 MILLILITER(S): 9 INJECTION INTRAMUSCULAR; INTRAVENOUS; SUBCUTANEOUS at 21:00

## 2021-08-26 RX ADMIN — CINACALCET 90 MILLIGRAM(S): 30 TABLET, FILM COATED ORAL at 12:13

## 2021-08-26 RX ADMIN — SODIUM CHLORIDE 100 MILLILITER(S): 9 INJECTION INTRAMUSCULAR; INTRAVENOUS; SUBCUTANEOUS at 05:24

## 2021-08-26 RX ADMIN — SIMVASTATIN 20 MILLIGRAM(S): 20 TABLET, FILM COATED ORAL at 21:39

## 2021-08-26 RX ADMIN — Medication 85 MILLIMOLE(S): at 13:57

## 2021-08-26 RX ADMIN — LOSARTAN POTASSIUM 50 MILLIGRAM(S): 100 TABLET, FILM COATED ORAL at 05:24

## 2021-08-26 RX ADMIN — HEPARIN SODIUM 5000 UNIT(S): 5000 INJECTION INTRAVENOUS; SUBCUTANEOUS at 16:16

## 2021-08-26 RX ADMIN — MAGNESIUM OXIDE 400 MG ORAL TABLET 400 MILLIGRAM(S): 241.3 TABLET ORAL at 12:13

## 2021-08-26 RX ADMIN — HEPARIN SODIUM 5000 UNIT(S): 5000 INJECTION INTRAVENOUS; SUBCUTANEOUS at 21:39

## 2021-08-27 ENCOUNTER — TRANSCRIPTION ENCOUNTER (OUTPATIENT)
Age: 81
End: 2021-08-27

## 2021-08-27 LAB
ANION GAP SERPL CALC-SCNC: 10 MMOL/L — SIGNIFICANT CHANGE UP (ref 5–17)
BASOPHILS # BLD AUTO: 0.07 K/UL — SIGNIFICANT CHANGE UP (ref 0–0.2)
BASOPHILS NFR BLD AUTO: 0.9 % — SIGNIFICANT CHANGE UP (ref 0–2)
BUN SERPL-MCNC: 8.4 MG/DL — SIGNIFICANT CHANGE UP (ref 8–20)
CA-I BLD-SCNC: 1.51 MMOL/L — HIGH (ref 1.15–1.33)
CALCIUM 24H UR-MRATE: 138 MG/24 H — SIGNIFICANT CHANGE UP (ref 50–150)
CALCIUM SERPL-MCNC: 10.8 MG/DL — HIGH (ref 8.6–10.2)
CHLORIDE SERPL-SCNC: 102 MMOL/L — SIGNIFICANT CHANGE UP (ref 98–107)
CO2 SERPL-SCNC: 24 MMOL/L — SIGNIFICANT CHANGE UP (ref 22–29)
CREAT SERPL-MCNC: 0.63 MG/DL — SIGNIFICANT CHANGE UP (ref 0.5–1.3)
CREATININE, URINE RESULT: 64 MG/DL — SIGNIFICANT CHANGE UP
EOSINOPHIL # BLD AUTO: 0.04 K/UL — SIGNIFICANT CHANGE UP (ref 0–0.5)
EOSINOPHIL NFR BLD AUTO: 0.5 % — SIGNIFICANT CHANGE UP (ref 0–6)
GLUCOSE SERPL-MCNC: 76 MG/DL — SIGNIFICANT CHANGE UP (ref 70–99)
HCT VFR BLD CALC: 34.1 % — LOW (ref 39–50)
HGB BLD-MCNC: 11.5 G/DL — LOW (ref 13–17)
IMM GRANULOCYTES NFR BLD AUTO: 0.3 % — SIGNIFICANT CHANGE UP (ref 0–1.5)
LYMPHOCYTES # BLD AUTO: 2.17 K/UL — SIGNIFICANT CHANGE UP (ref 1–3.3)
LYMPHOCYTES # BLD AUTO: 28.3 % — SIGNIFICANT CHANGE UP (ref 13–44)
MAGNESIUM SERPL-MCNC: 1.6 MG/DL — SIGNIFICANT CHANGE UP (ref 1.6–2.6)
MCHC RBC-ENTMCNC: 30.1 PG — SIGNIFICANT CHANGE UP (ref 27–34)
MCHC RBC-ENTMCNC: 33.7 GM/DL — SIGNIFICANT CHANGE UP (ref 32–36)
MCV RBC AUTO: 89.3 FL — SIGNIFICANT CHANGE UP (ref 80–100)
MONOCYTES # BLD AUTO: 0.64 K/UL — SIGNIFICANT CHANGE UP (ref 0–0.9)
MONOCYTES NFR BLD AUTO: 8.4 % — SIGNIFICANT CHANGE UP (ref 2–14)
NEUTROPHILS # BLD AUTO: 4.72 K/UL — SIGNIFICANT CHANGE UP (ref 1.8–7.4)
NEUTROPHILS NFR BLD AUTO: 61.6 % — SIGNIFICANT CHANGE UP (ref 43–77)
PHOSPHATE SERPL-MCNC: 2.4 MG/DL — SIGNIFICANT CHANGE UP (ref 2.4–4.7)
PLATELET # BLD AUTO: 289 K/UL — SIGNIFICANT CHANGE UP (ref 150–400)
POTASSIUM SERPL-MCNC: 3.7 MMOL/L — SIGNIFICANT CHANGE UP (ref 3.5–5.3)
POTASSIUM SERPL-SCNC: 3.7 MMOL/L — SIGNIFICANT CHANGE UP (ref 3.5–5.3)
PROT ?TM UR-MCNC: 22 MG/DL — HIGH (ref 0–12)
PROT SERPL-MCNC: 5.6 G/DL — LOW (ref 6–8.3)
PROT SERPL-MCNC: 5.6 G/DL — LOW (ref 6–8.3)
RBC # BLD: 3.82 M/UL — LOW (ref 4.2–5.8)
RBC # FLD: 14 % — SIGNIFICANT CHANGE UP (ref 10.3–14.5)
SODIUM SERPL-SCNC: 136 MMOL/L — SIGNIFICANT CHANGE UP (ref 135–145)
URINE CREATININE CALCULATION: 0.5 G/24 H — LOW (ref 1–2)
WBC # BLD: 7.66 K/UL — SIGNIFICANT CHANGE UP (ref 3.8–10.5)
WBC # FLD AUTO: 7.66 K/UL — SIGNIFICANT CHANGE UP (ref 3.8–10.5)

## 2021-08-27 PROCEDURE — 99232 SBSQ HOSP IP/OBS MODERATE 35: CPT

## 2021-08-27 PROCEDURE — 99233 SBSQ HOSP IP/OBS HIGH 50: CPT

## 2021-08-27 RX ORDER — LOSARTAN/HYDROCHLOROTHIAZIDE 100MG-25MG
1 TABLET ORAL
Qty: 0 | Refills: 0 | DISCHARGE

## 2021-08-27 RX ADMIN — MAGNESIUM OXIDE 400 MG ORAL TABLET 400 MILLIGRAM(S): 241.3 TABLET ORAL at 14:24

## 2021-08-27 RX ADMIN — SIMVASTATIN 20 MILLIGRAM(S): 20 TABLET, FILM COATED ORAL at 21:45

## 2021-08-27 RX ADMIN — CINACALCET 90 MILLIGRAM(S): 30 TABLET, FILM COATED ORAL at 14:24

## 2021-08-27 RX ADMIN — HEPARIN SODIUM 5000 UNIT(S): 5000 INJECTION INTRAVENOUS; SUBCUTANEOUS at 21:45

## 2021-08-27 RX ADMIN — LOSARTAN POTASSIUM 50 MILLIGRAM(S): 100 TABLET, FILM COATED ORAL at 05:05

## 2021-08-27 RX ADMIN — AMLODIPINE BESYLATE 5 MILLIGRAM(S): 2.5 TABLET ORAL at 05:05

## 2021-08-27 NOTE — DISCHARGE NOTE PROVIDER - NSDCMRMEDTOKEN_GEN_ALL_CORE_FT
amLODIPine 5 mg oral tablet: 1 tab(s) orally once a day  cinacalcet 60 mg oral tablet: 1 tab(s) orally once a day   losartan-hydrochlorothiazide 50mg-12.5mg oral tablet: 1 tab(s) orally once a day  magnesium oxide 250 mg oral tablet: 1 tab(s) orally once a day  simvastatin 20 mg oral tablet: 1 tab(s) orally once a day (at bedtime)  Vitamin D3 25 mcg (1000 intl units) oral capsule: 1 cap(s) orally 2 times a day  vitamin E 180 mg oral capsule: 1 cap(s) orally once a day   amLODIPine 5 mg oral tablet: 1 tab(s) orally once a day  cinacalcet 60 mg oral tablet: 1 tab(s) orally once a day   magnesium oxide 250 mg oral tablet: 1 tab(s) orally once a day  simvastatin 20 mg oral tablet: 1 tab(s) orally once a day (at bedtime)  Vitamin D3 25 mcg (1000 intl units) oral capsule: 1 cap(s) orally 2 times a day  vitamin E 180 mg oral capsule: 1 cap(s) orally once a day   amLODIPine 5 mg oral tablet: 1 tab(s) orally once a day  cinacalcet 90 mg oral tablet: 1 tab(s) orally 2 times a day   losartan 50 mg oral tablet: 1 tab(s) orally once a day  magnesium oxide 250 mg oral tablet: 1 tab(s) orally once a day  simvastatin 20 mg oral tablet: 1 tab(s) orally once a day (at bedtime)

## 2021-08-27 NOTE — DISCHARGE NOTE PROVIDER - HOSPITAL COURSE
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 Pt presented to [facility] in  w hypercalcemia and was advised to have gland removed per Endocrinologist but pt refused and has been on sensipar. Despite compliance w medication, Calcium was 13.2 on admission here. He has also had +weight loss 50-55 lbs over several months.   Pt admitted for hypercalcemia, likely 2/2 Primary HPT but also need to rule out malignancy given weight loss. Started on Calcitonin and his Cinacalcet dose was increased. He had further labs sent which showed abnormal Kappa and lambda on immunoelectrophoresis and high PSA. He also had CT chest done with no acute pathology. Endocrinologist reviewed outpt records with CT A/P which was also unremarkable. His Ca improved here, his IVF now DC'ed. Pt needs outpt 4D CT scan of his Parathyroid and follow up with Endocrinology, ENT and Heme/Onc.    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 Pt presented to [facility] in  w hypercalcemia and was advised to have gland removed per Endocrinologist but pt refused and has been on sensipar. Despite compliance w medication, Calcium was 13.2 on admission here. He has also had +weight loss 50-55 lbs over several months.   Pt admitted for hypercalcemia, likely 2/2 Primary HPT but also need to rule out malignancy given weight loss. received Calcitonin x 2 doses  and his Cinacalcet dose was increased. He had further labs sent which showed abnormal Kappa and lambda on immunoelectrophoresis and high PSA. He also had CT chest done with no acute pathology. Endocrinologist reviewed outpt records with CT A/P which was also unremarkable. His Ca improved but with little upward trend. ca today , received more ivf and small dose of lasix. Calcium, Total Serum: 11.8 mg/dL (08.29.21 @ 06:10),  iVF  and lasix now DC'ed as per endocrine oncall dr. vernon recommendations and sensipar increased to 90 mg po bid. patient needs outpt 4D CT scan of his Parathyroid and follow up with Endocrinology, ENT and Heme/Onc. patient verbalizes understanding. patient today denies any n/v/abd pain / dizziness.     Vital Signs Last 24 Hrs  T(C): 36.4 (29 Aug 2021 11:22), Max: 36.6 (29 Aug 2021 04:59)  T(F): 97.5 (29 Aug 2021 11:22), Max: 97.8 (29 Aug 2021 04:59)  HR: 73 (29 Aug 2021 11:22) (66 - 102)  BP: 132/74 (29 Aug 2021 11:22) (131/79 - 150/75)  BP(mean): --  RR: 18 (29 Aug 2021 11:22) (18 - 19)  SpO2: 99% (29 Aug 2021 11:22) (98% - 99%)     gen :  NAD  heent :  Moist oral mucosa  resp: Normal respiratory effort; lungs are clear to auscultation bilaterally  cvs : Regular rate and rhythm, normal S1 and S2, No lower extremity edema  abd : Nontender to palpation  musk: No clubbing or cyanosis of digits  psych : A+O to person, place, and time;x 3 affect appropriate  neuro: : No gross sensory or motor deficits   skin : No rashes        time spent for this dc 50 mins.

## 2021-08-27 NOTE — DISCHARGE NOTE PROVIDER - NSDCCPCAREPLAN_GEN_ALL_CORE_FT
PRINCIPAL DISCHARGE DIAGNOSIS  Diagnosis: Hypercalcemia  Assessment and Plan of Treatment: Etiology: Primary HPT but also need to rule out malignancy given weight loss   s/p Calcitonin here   Cinacalcet dose increased per Endo   Hold HCTZ 12.5, avoid thiazide diuretics   Will need follow up with ENT and Heme/Onc on DC      SECONDARY DISCHARGE DIAGNOSES  Diagnosis: Primary hyperparathyroidism  Assessment and Plan of Treatment: Needs 4D CT scan of PTH and ENT follow up    Diagnosis: Weight loss  Assessment and Plan of Treatment: High Kappa and lambda on immunoelectrophoresis and high PSA  Needs Heme/Onc follow up on DC     PRINCIPAL DISCHARGE DIAGNOSIS  Diagnosis: Hypercalcemia  Assessment and Plan of Treatment: Cinacalcet dose increased per Endocrinology.   drink plenty of fluids.   Hold HCTZ 12.5, avoid thiazide diuretics    follow up with ENT and Heme/Oncology as out patient for further work up and tests and to schedule 4 D scan of Parathyroid gland.      SECONDARY DISCHARGE DIAGNOSES  Diagnosis: Primary hyperparathyroidism  Assessment and Plan of Treatment: ENT follow up for 4 d scan an dfurther work up and treatment.    Diagnosis: Weight loss  Assessment and Plan of Treatment: High Kappa and lambda on immunoelectrophoresis and high PSA levels for prostate.   follow up with  Heme/Oncology  and urology  as out patient.

## 2021-08-27 NOTE — DISCHARGE NOTE PROVIDER - CARE PROVIDER_API CALL
PCP,   Phone: (   )    -  Fax: (   )    -  Follow Up Time:     Bing Dinero)  EndocrinologyMetabDiabetes; Internal Medicine  [address] [place] [address]  Phone: [phone]  Fax: [phone]  Follow Up Time:     Rajwinder Ramos)  Hematology; HospicePalliative Medicine; Internal Medicine; Medical Oncology  [address] [place] [address]  Phone: [phone]  Fax: [phone]  Follow Up Time:     ENT,   Phone: (   )    -  Fax: (   )    -  Follow Up Time:    Bing Dinero)  EndocrinologyMetabDiabetes; Internal Medicine  [address] [place] [address]  Phone: [phone]  Fax: [phone]  Follow Up Time: 1-3 days    Rajwinder Ramos)  Hematology; HospicePalliative Medicine; Internal Medicine; Medical Oncology  [address] [place] [address]  Phone: [phone]  Fax: [phone]  Follow Up Time: 1-3 days    Charles Beckman)  Otolaryngology  [facility] [facility], [address]  [place] [address]  Phone: [phone]  Fax: [phone]  Follow Up Time: 1-3 days    primary care,   dr. Paige  Phone: (   )    -  Fax: (   )    -  Follow Up Time: 1-3 days    Blaise Garcia  UROLOGY  [address] [place] [address]  Phone: [phone]  Fax: [phone]  Follow Up Time: 1-3 days

## 2021-08-27 NOTE — DISCHARGE NOTE PROVIDER - NPI NUMBER (FOR SYSADMIN USE ONLY) :
[UNKNOWN],[[id number]],[[id number]],[UNKNOWN] [[id number]],[[id number]],[[id number]],[UNKNOWN],[[id number]]

## 2021-08-27 NOTE — DISCHARGE NOTE PROVIDER - INSTRUCTIONS
Provided high calorie high protein diet guidelines. Pt agreeable to supplements; Nepro shake (low in Ca+) in sip between meals.

## 2021-08-27 NOTE — DIETITIAN INITIAL EVALUATION ADULT. - OTHER INFO
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   Pt admitted with Hypercalcemia, Elevated ionized calcium (Etiology unclear, need to rule out malignancy given weight loss).   Pt reports having persistent lack of appetite over past 6-8 months, and has lost approximately 55# in this time. Pt reports some days eats one meal day. Pt reports having no energy and no desire to eat.   Pt denies pain, N/V/D or difficulty chewing or swallowing. Provided high calorie high protein diet guidelines. Pt agreeable to supplements; provided samples of Nepro shake (low in Ca+) in sip between meals. Pt with good understanding and expected compliance. NFPE conducted.

## 2021-08-27 NOTE — DISCHARGE NOTE PROVIDER - DETAILS OF MALNUTRITION DIAGNOSIS/DIAGNOSES
This patient has been assessed with a concern for Malnutrition and was treated during this hospitalization for the following Nutrition diagnosis/diagnoses:     -  08/27/2021: Severe protein-calorie malnutrition   -  08/27/2021: Underweight (BMI < 19)

## 2021-08-27 NOTE — PROGRESS NOTE ADULT - NUTRITIONAL ASSESSMENT
This patient has been assessed with a concern for Malnutrition and has been determined to have a diagnosis/diagnoses of Severe protein-calorie malnutrition and Underweight (BMI < 19).    This patient is being managed with:   Diet Regular-  Entered: Aug 24 2021  5:56AM

## 2021-08-27 NOTE — DISCHARGE NOTE PROVIDER - CARE PROVIDERS DIRECT ADDRESSES
,DirectAddress_Unknown,[email or web address],[email or web address] ,[email or web address],DirectAddress_Unknown,DirectAddress_Unknown,[email or web address]

## 2021-08-27 NOTE — DISCHARGE NOTE PROVIDER - PROVIDER TOKENS
FREE:[LAST:[PCP],PHONE:[(   )    -],FAX:[(   )    -]],PROVIDER:[TOKEN:[[id number]]],PROVIDER:[TOKEN:[[id number]]],FREE:[LAST:[ENT],PHONE:[(   )    -],FAX:[(   )    -]] PROVIDER:[TOKEN:[[id number]],FOLLOWUP:[1-3 days]],PROVIDER:[TOKEN:[[id number]],FOLLOWUP:[1-3 days]],PROVIDER:[TOKEN:[[id number]],FOLLOWUP:[1-3 days]],FREE:[LAST:[primary care],PHONE:[(   )    -],FAX:[(   )    -],ADDRESS:[dr. Paige],FOLLOWUP:[1-3 days]],PROVIDER:[TOKEN:[[id number]],FOLLOWUP:[1-3 days]]

## 2021-08-27 NOTE — DIETITIAN INITIAL EVALUATION ADULT. - NS FNS CHANGE IN WEIGHT
CC:  Becky Snider is here today for Consultation (new patient, allergy to dogs etc)  .  Refills needed today?  NO      
Loss

## 2021-08-27 NOTE — DIETITIAN INITIAL EVALUATION ADULT. - PERTINENT LABORATORY DATA
08-27 Na136 mmol/L Glu 76 mg/dL K+ 3.7 mmol/L Cr  0.63 mg/dL BUN 8.4 mg/dL Phos 2.4 mg/dL Alb n/a   PAB n/a

## 2021-08-27 NOTE — DIETITIAN INITIAL EVALUATION ADULT. - ETIOLOGY
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

## 2021-08-28 LAB
ANION GAP SERPL CALC-SCNC: 10 MMOL/L — SIGNIFICANT CHANGE UP (ref 5–17)
BASOPHILS # BLD AUTO: 0.05 K/UL — SIGNIFICANT CHANGE UP (ref 0–0.2)
BASOPHILS NFR BLD AUTO: 0.7 % — SIGNIFICANT CHANGE UP (ref 0–2)
BUN SERPL-MCNC: 11.8 MG/DL — SIGNIFICANT CHANGE UP (ref 8–20)
CA-I BLD-SCNC: 1.57 MMOL/L — HIGH (ref 1.15–1.33)
CALCIUM SERPL-MCNC: 11.4 MG/DL — HIGH (ref 8.6–10.2)
CHLORIDE SERPL-SCNC: 101 MMOL/L — SIGNIFICANT CHANGE UP (ref 98–107)
CO2 SERPL-SCNC: 25 MMOL/L — SIGNIFICANT CHANGE UP (ref 22–29)
COLLECT DURATION TIME UR: 24 HR — SIGNIFICANT CHANGE UP
CREAT SERPL-MCNC: 0.63 MG/DL — SIGNIFICANT CHANGE UP (ref 0.5–1.3)
EOSINOPHIL # BLD AUTO: 0.07 K/UL — SIGNIFICANT CHANGE UP (ref 0–0.5)
EOSINOPHIL NFR BLD AUTO: 1 % — SIGNIFICANT CHANGE UP (ref 0–6)
GLUCOSE SERPL-MCNC: 78 MG/DL — SIGNIFICANT CHANGE UP (ref 70–99)
HCT VFR BLD CALC: 34.7 % — LOW (ref 39–50)
HGB BLD-MCNC: 12 G/DL — LOW (ref 13–17)
IMM GRANULOCYTES NFR BLD AUTO: 0.4 % — SIGNIFICANT CHANGE UP (ref 0–1.5)
LYMPHOCYTES # BLD AUTO: 2.35 K/UL — SIGNIFICANT CHANGE UP (ref 1–3.3)
LYMPHOCYTES # BLD AUTO: 33.5 % — SIGNIFICANT CHANGE UP (ref 13–44)
MCHC RBC-ENTMCNC: 30.5 PG — SIGNIFICANT CHANGE UP (ref 27–34)
MCHC RBC-ENTMCNC: 34.6 GM/DL — SIGNIFICANT CHANGE UP (ref 32–36)
MCV RBC AUTO: 88.1 FL — SIGNIFICANT CHANGE UP (ref 80–100)
MONOCYTES # BLD AUTO: 0.61 K/UL — SIGNIFICANT CHANGE UP (ref 0–0.9)
MONOCYTES NFR BLD AUTO: 8.7 % — SIGNIFICANT CHANGE UP (ref 2–14)
NEUTROPHILS # BLD AUTO: 3.9 K/UL — SIGNIFICANT CHANGE UP (ref 1.8–7.4)
NEUTROPHILS NFR BLD AUTO: 55.7 % — SIGNIFICANT CHANGE UP (ref 43–77)
PLATELET # BLD AUTO: 288 K/UL — SIGNIFICANT CHANGE UP (ref 150–400)
POTASSIUM SERPL-MCNC: 3.3 MMOL/L — LOW (ref 3.5–5.3)
POTASSIUM SERPL-SCNC: 3.3 MMOL/L — LOW (ref 3.5–5.3)
RBC # BLD: 3.94 M/UL — LOW (ref 4.2–5.8)
RBC # FLD: 14.3 % — SIGNIFICANT CHANGE UP (ref 10.3–14.5)
SODIUM SERPL-SCNC: 136 MMOL/L — SIGNIFICANT CHANGE UP (ref 135–145)
TOTAL VOLUME - 24 HOUR: 725 ML — SIGNIFICANT CHANGE UP
WBC # BLD: 7.01 K/UL — SIGNIFICANT CHANGE UP (ref 3.8–10.5)
WBC # FLD AUTO: 7.01 K/UL — SIGNIFICANT CHANGE UP (ref 3.8–10.5)

## 2021-08-28 PROCEDURE — 99233 SBSQ HOSP IP/OBS HIGH 50: CPT

## 2021-08-28 RX ORDER — SODIUM CHLORIDE 9 MG/ML
1000 INJECTION INTRAMUSCULAR; INTRAVENOUS; SUBCUTANEOUS
Refills: 0 | Status: DISCONTINUED | OUTPATIENT
Start: 2021-08-28 | End: 2021-08-29

## 2021-08-28 RX ORDER — FUROSEMIDE 40 MG
20 TABLET ORAL DAILY
Refills: 0 | Status: DISCONTINUED | OUTPATIENT
Start: 2021-08-28 | End: 2021-08-29

## 2021-08-28 RX ORDER — POTASSIUM CHLORIDE 20 MEQ
40 PACKET (EA) ORAL ONCE
Refills: 0 | Status: COMPLETED | OUTPATIENT
Start: 2021-08-28 | End: 2021-08-28

## 2021-08-28 RX ADMIN — Medication 3 MILLIGRAM(S): at 23:34

## 2021-08-28 RX ADMIN — Medication 20 MILLIGRAM(S): at 12:54

## 2021-08-28 RX ADMIN — HEPARIN SODIUM 5000 UNIT(S): 5000 INJECTION INTRAVENOUS; SUBCUTANEOUS at 12:54

## 2021-08-28 RX ADMIN — LOSARTAN POTASSIUM 50 MILLIGRAM(S): 100 TABLET, FILM COATED ORAL at 05:11

## 2021-08-28 RX ADMIN — SIMVASTATIN 20 MILLIGRAM(S): 20 TABLET, FILM COATED ORAL at 23:35

## 2021-08-28 RX ADMIN — HEPARIN SODIUM 5000 UNIT(S): 5000 INJECTION INTRAVENOUS; SUBCUTANEOUS at 23:35

## 2021-08-28 RX ADMIN — AMLODIPINE BESYLATE 5 MILLIGRAM(S): 2.5 TABLET ORAL at 05:11

## 2021-08-28 RX ADMIN — MAGNESIUM OXIDE 400 MG ORAL TABLET 400 MILLIGRAM(S): 241.3 TABLET ORAL at 12:54

## 2021-08-28 RX ADMIN — Medication 40 MILLIEQUIVALENT(S): at 10:46

## 2021-08-28 RX ADMIN — HEPARIN SODIUM 5000 UNIT(S): 5000 INJECTION INTRAVENOUS; SUBCUTANEOUS at 05:11

## 2021-08-28 RX ADMIN — CINACALCET 90 MILLIGRAM(S): 30 TABLET, FILM COATED ORAL at 12:54

## 2021-08-29 ENCOUNTER — TRANSCRIPTION ENCOUNTER (OUTPATIENT)
Age: 81
End: 2021-08-29

## 2021-08-29 VITALS
TEMPERATURE: 98 F | RESPIRATION RATE: 18 BRPM | SYSTOLIC BLOOD PRESSURE: 161 MMHG | OXYGEN SATURATION: 98 % | DIASTOLIC BLOOD PRESSURE: 76 MMHG | HEART RATE: 71 BPM

## 2021-08-29 LAB
ALBUMIN SERPL ELPH-MCNC: 3.8 G/DL — SIGNIFICANT CHANGE UP (ref 3.3–5.2)
ALP SERPL-CCNC: 91 U/L — SIGNIFICANT CHANGE UP (ref 40–120)
ALT FLD-CCNC: 19 U/L — SIGNIFICANT CHANGE UP
ANION GAP SERPL CALC-SCNC: 14 MMOL/L — SIGNIFICANT CHANGE UP (ref 5–17)
AST SERPL-CCNC: 27 U/L — SIGNIFICANT CHANGE UP
BILIRUB SERPL-MCNC: 0.4 MG/DL — SIGNIFICANT CHANGE UP (ref 0.4–2)
BUN SERPL-MCNC: 16.5 MG/DL — SIGNIFICANT CHANGE UP (ref 8–20)
CA-I BLD-SCNC: 1.57 MMOL/L — HIGH (ref 1.15–1.33)
CALCIUM SERPL-MCNC: 11.8 MG/DL — HIGH (ref 8.6–10.2)
CHLORIDE SERPL-SCNC: 102 MMOL/L — SIGNIFICANT CHANGE UP (ref 98–107)
CO2 SERPL-SCNC: 23 MMOL/L — SIGNIFICANT CHANGE UP (ref 22–29)
CREAT SERPL-MCNC: 0.65 MG/DL — SIGNIFICANT CHANGE UP (ref 0.5–1.3)
GLUCOSE SERPL-MCNC: 72 MG/DL — SIGNIFICANT CHANGE UP (ref 70–99)
HCT VFR BLD CALC: 34.7 % — LOW (ref 39–50)
HGB BLD-MCNC: 11.7 G/DL — LOW (ref 13–17)
MCHC RBC-ENTMCNC: 30.4 PG — SIGNIFICANT CHANGE UP (ref 27–34)
MCHC RBC-ENTMCNC: 33.7 GM/DL — SIGNIFICANT CHANGE UP (ref 32–36)
MCV RBC AUTO: 90.1 FL — SIGNIFICANT CHANGE UP (ref 80–100)
PLATELET # BLD AUTO: 296 K/UL — SIGNIFICANT CHANGE UP (ref 150–400)
POTASSIUM SERPL-MCNC: 3.6 MMOL/L — SIGNIFICANT CHANGE UP (ref 3.5–5.3)
POTASSIUM SERPL-SCNC: 3.6 MMOL/L — SIGNIFICANT CHANGE UP (ref 3.5–5.3)
PROT SERPL-MCNC: 6.6 G/DL — SIGNIFICANT CHANGE UP (ref 6.6–8.7)
RBC # BLD: 3.85 M/UL — LOW (ref 4.2–5.8)
RBC # FLD: 14.3 % — SIGNIFICANT CHANGE UP (ref 10.3–14.5)
SODIUM SERPL-SCNC: 138 MMOL/L — SIGNIFICANT CHANGE UP (ref 135–145)
WBC # BLD: 7.03 K/UL — SIGNIFICANT CHANGE UP (ref 3.8–10.5)
WBC # FLD AUTO: 7.03 K/UL — SIGNIFICANT CHANGE UP (ref 3.8–10.5)

## 2021-08-29 PROCEDURE — 83970 ASSAY OF PARATHORMONE: CPT

## 2021-08-29 PROCEDURE — 85027 COMPLETE CBC AUTOMATED: CPT

## 2021-08-29 PROCEDURE — 99232 SBSQ HOSP IP/OBS MODERATE 35: CPT

## 2021-08-29 PROCEDURE — 82330 ASSAY OF CALCIUM: CPT

## 2021-08-29 PROCEDURE — 80048 BASIC METABOLIC PNL TOTAL CA: CPT

## 2021-08-29 PROCEDURE — 84156 ASSAY OF PROTEIN URINE: CPT

## 2021-08-29 PROCEDURE — 84155 ASSAY OF PROTEIN SERUM: CPT

## 2021-08-29 PROCEDURE — 82310 ASSAY OF CALCIUM: CPT

## 2021-08-29 PROCEDURE — 82340 ASSAY OF CALCIUM IN URINE: CPT

## 2021-08-29 PROCEDURE — 82962 GLUCOSE BLOOD TEST: CPT

## 2021-08-29 PROCEDURE — 84165 PROTEIN E-PHORESIS SERUM: CPT

## 2021-08-29 PROCEDURE — 82652 VIT D 1 25-DIHYDROXY: CPT

## 2021-08-29 PROCEDURE — U0003: CPT

## 2021-08-29 PROCEDURE — 84100 ASSAY OF PHOSPHORUS: CPT

## 2021-08-29 PROCEDURE — U0005: CPT

## 2021-08-29 PROCEDURE — 84166 PROTEIN E-PHORESIS/URINE/CSF: CPT

## 2021-08-29 PROCEDURE — 82570 ASSAY OF URINE CREATININE: CPT

## 2021-08-29 PROCEDURE — 36415 COLL VENOUS BLD VENIPUNCTURE: CPT

## 2021-08-29 PROCEDURE — 82164 ANGIOTENSIN I ENZYME TEST: CPT

## 2021-08-29 PROCEDURE — 80053 COMPREHEN METABOLIC PANEL: CPT

## 2021-08-29 PROCEDURE — 99285 EMERGENCY DEPT VISIT HI MDM: CPT

## 2021-08-29 PROCEDURE — 85025 COMPLETE CBC W/AUTO DIFF WBC: CPT

## 2021-08-29 PROCEDURE — 84443 ASSAY THYROID STIM HORMONE: CPT

## 2021-08-29 PROCEDURE — G0103: CPT

## 2021-08-29 PROCEDURE — 93005 ELECTROCARDIOGRAM TRACING: CPT

## 2021-08-29 PROCEDURE — 71250 CT THORAX DX C-: CPT

## 2021-08-29 PROCEDURE — 86769 SARS-COV-2 COVID-19 ANTIBODY: CPT

## 2021-08-29 PROCEDURE — 99239 HOSP IP/OBS DSCHRG MGMT >30: CPT

## 2021-08-29 PROCEDURE — 83735 ASSAY OF MAGNESIUM: CPT

## 2021-08-29 PROCEDURE — 82306 VITAMIN D 25 HYDROXY: CPT

## 2021-08-29 RX ORDER — CINACALCET 30 MG/1
1 TABLET, FILM COATED ORAL
Qty: 60 | Refills: 0
Start: 2021-08-29 | End: 2021-09-27

## 2021-08-29 RX ORDER — VITAMIN E 100 UNIT
1 CAPSULE ORAL
Qty: 0 | Refills: 0 | DISCHARGE

## 2021-08-29 RX ORDER — LOSARTAN POTASSIUM 100 MG/1
1 TABLET, FILM COATED ORAL
Qty: 30 | Refills: 0
Start: 2021-08-29 | End: 2021-09-27

## 2021-08-29 RX ORDER — CHOLECALCIFEROL (VITAMIN D3) 125 MCG
1 CAPSULE ORAL
Qty: 0 | Refills: 0 | DISCHARGE

## 2021-08-29 RX ADMIN — AMLODIPINE BESYLATE 5 MILLIGRAM(S): 2.5 TABLET ORAL at 05:06

## 2021-08-29 RX ADMIN — MAGNESIUM OXIDE 400 MG ORAL TABLET 400 MILLIGRAM(S): 241.3 TABLET ORAL at 12:54

## 2021-08-29 RX ADMIN — SODIUM CHLORIDE 100 MILLILITER(S): 9 INJECTION INTRAMUSCULAR; INTRAVENOUS; SUBCUTANEOUS at 08:36

## 2021-08-29 RX ADMIN — CINACALCET 90 MILLIGRAM(S): 30 TABLET, FILM COATED ORAL at 12:53

## 2021-08-29 RX ADMIN — HEPARIN SODIUM 5000 UNIT(S): 5000 INJECTION INTRAVENOUS; SUBCUTANEOUS at 05:06

## 2021-08-29 RX ADMIN — SODIUM CHLORIDE 100 MILLILITER(S): 9 INJECTION INTRAMUSCULAR; INTRAVENOUS; SUBCUTANEOUS at 12:53

## 2021-08-29 RX ADMIN — LOSARTAN POTASSIUM 50 MILLIGRAM(S): 100 TABLET, FILM COATED ORAL at 05:06

## 2021-08-29 RX ADMIN — Medication 20 MILLIGRAM(S): at 05:05

## 2021-08-29 NOTE — PROGRESS NOTE ADULT - PROVIDER SPECIALTY LIST ADULT
Hospitalist
Hospitalist
Endocrinology
Endocrinology
Hospitalist
Endocrinology
Endocrinology
Hospitalist

## 2021-08-29 NOTE — DISCHARGE NOTE NURSING/CASE MANAGEMENT/SOCIAL WORK - NSDCPEFALRISK_GEN_ALL_CORE
For information on Fall & injury Prevention, visit [email or web address]

## 2021-08-29 NOTE — PROGRESS NOTE ADULT - SUBJECTIVE AND OBJECTIVE BOX
[facility] Division of Hospital Medicine    Chief Complaint:  Hypercalcemia     SUBJECTIVE / OVERNIGHT EVENTS:  No overnight events   Pt says he feels OK this morning  Tells me he is worried about his diagnosis because he has lost 50+ lbs     Patient denies chest pain, SOB, abd pain, N/V, fever, chills, dysuria or any other complaints. All remainder ROS negative.     MEDICATIONS  (STANDING):  amLODIPine   Tablet 5 milliGRAM(s) Oral daily  cinacalcet 90 milliGRAM(s) Oral daily  heparin   Injectable 5000 Unit(s) SubCutaneous every 8 hours  losartan 50 milliGRAM(s) Oral daily  magnesium oxide 400 milliGRAM(s) Oral daily  simvastatin 20 milliGRAM(s) Oral at bedtime  sodium chloride 0.9%. 1000 milliLiter(s) (100 mL/Hr) IV Continuous <Continuous>    MEDICATIONS  (PRN):  acetaminophen   Tablet .. 650 milliGRAM(s) Oral every 6 hours PRN Temp greater or equal to 38.5C (101.3F), Mild Pain (1 - 3)  aluminum hydroxide/magnesium hydroxide/simethicone Suspension 30 milliLiter(s) Oral every 4 hours PRN Dyspepsia  melatonin 3 milliGRAM(s) Oral at bedtime PRN Insomnia  ondansetron Injectable 4 milliGRAM(s) IV Push every 8 hours PRN Nausea and/or Vomiting        I&O's Summary      PHYSICAL EXAM:  Vital Signs Last 24 Hrs  T(C): 36.3 (25 Aug 2021 10:42), Max: 36.7 (24 Aug 2021 23:50)  T(F): 97.4 (25 Aug 2021 10:42), Max: 98.1 (24 Aug 2021 23:50)  HR: 75 (25 Aug 2021 10:42) (63 - 84)  BP: 147/67 (25 Aug 2021 10:42) (147/67 - 171/90)  BP(mean): --  RR: 19 (25 Aug 2021 10:42) (18 - 20)  SpO2: 100% (25 Aug 2021 10:42) (99% - 100%)        CONSTITUTIONAL: NAD  ENMT: Moist oral mucosa  RESPIRATORY: Normal respiratory effort; lungs are clear to auscultation bilaterally  CARDIOVASCULAR: Regular rate and rhythm, normal S1 and S2, No lower extremity edema  ABDOMEN: Nontender to palpation  MUSCULOSKELETAL: No clubbing or cyanosis of digits  PSYCH: A+O to person, place, and time; affect appropriate  NEUROLOGY: No gross sensory or motor deficits   SKIN: No rashes    LABS:                        12.8   9.78  )-----------( 306      ( 25 Aug 2021 05:54 )             38.0     08-25    140  |  102  |  7.5<L>  ----------------------------<  105<H>  4.4   |  27.0  |  0.56    Ca    12.2<H>      25 Aug 2021 05:54  Phos  1.7     08-23  Mg     1.5     08-23    TPro  6.7  /  Alb  3.9  /  TBili  0.3<L>  /  DBili  x   /  AST  21  /  ALT  12  /  AlkPhos  99  08-23        
INTERVAL HPI/OVERNIGHT EVENTS:  follow up Hypercalcmeia  due likely to Priamry HPT   called Dr Johnson- office NA  to speak wiht me but assistant gave me copy of CT abd- only sihg for cholelithiasis and non obstrucitng 5 mm left renal stone   Per rejosy pt alos with osteoporosis and refused Alendronate   also documented to refuse surgery many times  for parathyridectomy   pt also states that with weight loss- he thinks it is possible it is due to him only eating 1 meal per day as he wakes up at 1-2PM  and missed breakfast and lunch     goes to bed at 2 AM   MEDICATIONS  (STANDING):  amLODIPine   Tablet 5 milliGRAM(s) Oral daily  cinacalcet 90 milliGRAM(s) Oral daily  heparin   Injectable 5000 Unit(s) SubCutaneous every 8 hours  losartan 50 milliGRAM(s) Oral daily  magnesium oxide 400 milliGRAM(s) Oral daily  simvastatin 20 milliGRAM(s) Oral at bedtime  sodium chloride 0.9%. 1000 milliLiter(s) (100 mL/Hr) IV Continuous <Continuous>    MEDICATIONS  (PRN):  acetaminophen   Tablet .. 650 milliGRAM(s) Oral every 6 hours PRN Temp greater or equal to 38.5C (101.3F), Mild Pain (1 - 3)  aluminum hydroxide/magnesium hydroxide/simethicone Suspension 30 milliLiter(s) Oral every 4 hours PRN Dyspepsia  melatonin 3 milliGRAM(s) Oral at bedtime PRN Insomnia  ondansetron Injectable 4 milliGRAM(s) IV Push every 8 hours PRN Nausea and/or Vomiting      Allergies    No Known Allergies    Intolerances        Review of systems:  + constipaitn   Vital Signs Last 24 Hrs  T(C): 36.4 (25 Aug 2021 20:10), Max: 36.6 (25 Aug 2021 05:17)  T(F): 97.5 (25 Aug 2021 20:10), Max: 97.8 (25 Aug 2021 05:17)  HR: 76 (25 Aug 2021 20:10) (63 - 84)  BP: 149/69 (25 Aug 2021 20:10) (147/67 - 167/88)  BP(mean): --  RR: 18 (25 Aug 2021 20:10) (18 - 19)  SpO2: 100% (25 Aug 2021 20:10) (99% - 100%)    PHYSICAL EXAM:      Constitutional: NAD, well-groomed, well-developed  HEENT: PERRLA, EOMI, no exophalmos  Neck: No LAD, No JVD, trachea midline, no thyroid enlargement    Respiratory: CTAB  Cardiovascular: S1 and S2, RRR, no M/G/R  Gastrointestinal: BS+, soft, no organomeglag or mass  Extremities: No peripheral edema, no pedal lesions  Vascular: 2+ peripheral pulses  Neurological: A/O x 3, no focal deficits  Psychiatric: Normal mood, normal affect  Musculoskeletal: 5/5 strength b/l upper and lower extremities  Skin: No rashes, no acanthosis        LABS:                        12.8   9.78  )-----------( 306      ( 25 Aug 2021 05:54 )             38.0     08-25    140  |  102  |  7.5<L>  ----------------------------<  105<H>  4.4   |  27.0  |  0.56    Ca    12.2<H>      25 Aug 2021 05:54    TPro  6.5  /  Alb  x   /  TBili  x   /  DBili  x   /  AST  x   /  ALT  x   /  AlkPhos  x   08-25          CAPILLARY BLOOD GLUCOSE      RADIOLOGY & ADDITIONAL TESTS:  
INTERVAL HPI/OVERNIGHT EVENTS:  follow up Hyperparathryoidsm    pt toget CT scan chest   Spokew ith Dr Johnson- pt very difficult to get to take her advice -  He was not answeting her calls- she sent police to his house to get  him ot ocme to hospital   Pt with h/po inc PSA last seen by Urology in 2017   PSA was 10 in 2017       SPoek to pt before going to [facility]   he was thinking he should have surgery while he is here   MEDICATIONS  (STANDING):  amLODIPine   Tablet 5 milliGRAM(s) Oral daily  cinacalcet 90 milliGRAM(s) Oral daily  heparin   Injectable 5000 Unit(s) SubCutaneous every 8 hours  losartan 50 milliGRAM(s) Oral daily  magnesium oxide 400 milliGRAM(s) Oral daily  simvastatin 20 milliGRAM(s) Oral at bedtime  sodium chloride 0.9%. 1000 milliLiter(s) (100 mL/Hr) IV Continuous <Continuous>    MEDICATIONS  (PRN):  acetaminophen   Tablet .. 650 milliGRAM(s) Oral every 6 hours PRN Temp greater or equal to 38.5C (101.3F), Mild Pain (1 - 3)  aluminum hydroxide/magnesium hydroxide/simethicone Suspension 30 milliLiter(s) Oral every 4 hours PRN Dyspepsia  melatonin 3 milliGRAM(s) Oral at bedtime PRN Insomnia  ondansetron Injectable 4 milliGRAM(s) IV Push every 8 hours PRN Nausea and/or Vomiting      Allergies    No Known Allergies    Intolerances        Review of systems:  No CP no presusr e  Vital Signs Last 24 Hrs  T(C): 36.6 (26 Aug 2021 16:14), Max: 37 (26 Aug 2021 05:13)  T(F): 97.9 (26 Aug 2021 16:14), Max: 98.6 (26 Aug 2021 05:13)  HR: 72 (26 Aug 2021 16:14) (61 - 77)  BP: 144/74 (26 Aug 2021 16:14) (130/63 - 144/74)  BP(mean): --  RR: 18 (26 Aug 2021 16:14) (18 - 18)  SpO2: 98% (26 Aug 2021 16:14) (97% - 100%)    PHYSICAL EXAM:      Constitutional: NAD, well-groomed, well-developed  HEENT: PERRLA, EOMI, no exophalmos  Neck: No LAD, No JVD, trachea midline, no thyroid enlargement  Back: Normal spine flexure, No CVA tenderness  Respiratory: CTAB  Cardiovascular: S1 and S2, RRR, no M/G/R  Gastrointestinal: BS+, soft, no organomeglag or mass  Extremities: No peripheral edema, no pedal lesions  Vascular: 2+ peripheral pulses          LABS:                        11.0   7.12  )-----------( 262      ( 26 Aug 2021 06:59 )             32.4     08-26    135  |  103  |  6.9<L>  ----------------------------<  81  4.0   |  27.0  |  0.50    Ca    10.2      26 Aug 2021 06:59  Phos  1.1     08-26  Mg     1.4     08-26    TPro  6.5  /  Alb  x   /  TBili  x   /  DBili  x   /  AST  x   /  ALT  x   /  AlkPhos  x   08-25          CAPILLARY BLOOD GLUCOSE    CAPILLARY BLOOD GLUCOSE      POCT Blood Glucose.: 103 mg/dL (26 Aug 2021 16:42)    RADIOLOGY & ADDITIONAL TESTS:  
INTERVAL HPI/OVERNIGHT EVENTS: follow up hyperparathyroidism    MEDICATIONS  (STANDING):  amLODIPine   Tablet 5 milliGRAM(s) Oral daily  cinacalcet 90 milliGRAM(s) Oral daily  heparin   Injectable 5000 Unit(s) SubCutaneous every 8 hours  losartan 50 milliGRAM(s) Oral daily  magnesium oxide 400 milliGRAM(s) Oral daily  simvastatin 20 milliGRAM(s) Oral at bedtime  sodium chloride 0.9%. 1000 milliLiter(s) (100 mL/Hr) IV Continuous <Continuous>    MEDICATIONS  (PRN):  acetaminophen   Tablet .. 650 milliGRAM(s) Oral every 6 hours PRN Temp greater or equal to 38.5C (101.3F), Mild Pain (1 - 3)  aluminum hydroxide/magnesium hydroxide/simethicone Suspension 30 milliLiter(s) Oral every 4 hours PRN Dyspepsia  melatonin 3 milliGRAM(s) Oral at bedtime PRN Insomnia  ondansetron Injectable 4 milliGRAM(s) IV Push every 8 hours PRN Nausea and/or Vomiting      Allergies    No Known Allergies    Intolerances        Review of systems: feels well, awaiting discharge    Vital Signs Last 24 Hrs  T(C): 36.4 (29 Aug 2021 11:22), Max: 36.6 (29 Aug 2021 04:59)  T(F): 97.5 (29 Aug 2021 11:22), Max: 97.8 (29 Aug 2021 04:59)  HR: 73 (29 Aug 2021 11:22) (66 - 82)  BP: 132/74 (29 Aug 2021 11:22) (132/74 - 150/75)  BP(mean): --  RR: 18 (29 Aug 2021 11:22) (18 - 19)  SpO2: 99% (29 Aug 2021 11:22) (98% - 99%)    PHYSICAL EXAM:  alert, oriented, NAD      LABS:                        11.7   7.03  )-----------( 296      ( 29 Aug 2021 06:10 )             34.7     08-29    138  |  102  |  16.5  ----------------------------<  72  3.6   |  23.0  |  0.65    Ca    11.8<H>      29 Aug 2021 06:10    TPro  6.6  /  Alb  3.8  /  TBili  0.4  /  DBili  x   /  AST  27  /  ALT  19  /  AlkPhos  91  08-29          POCT Blood Glucose.: 103 mg/dL (08-26-21 @ 16:42)        
INTERVAL HPI/OVERNIGHT EVENTS:  follow up ypercalcemia due to PHPT  MEDICATIONS  (STANDING):  amLODIPine   Tablet 5 milliGRAM(s) Oral daily  cinacalcet 90 milliGRAM(s) Oral daily  heparin   Injectable 5000 Unit(s) SubCutaneous every 8 hours  losartan 50 milliGRAM(s) Oral daily  magnesium oxide 400 milliGRAM(s) Oral daily  simvastatin 20 milliGRAM(s) Oral at bedtime    MEDICATIONS  (PRN):  acetaminophen   Tablet .. 650 milliGRAM(s) Oral every 6 hours PRN Temp greater or equal to 38.5C (101.3F), Mild Pain (1 - 3)  aluminum hydroxide/magnesium hydroxide/simethicone Suspension 30 milliLiter(s) Oral every 4 hours PRN Dyspepsia  melatonin 3 milliGRAM(s) Oral at bedtime PRN Insomnia  ondansetron Injectable 4 milliGRAM(s) IV Push every 8 hours PRN Nausea and/or Vomiting      Allergies    No Known Allergies    Intolerances        Review of systems:    Vital Signs Last 24 Hrs  T(C): 36.4 (27 Aug 2021 15:44), Max: 36.9 (27 Aug 2021 04:34)  T(F): 97.6 (27 Aug 2021 15:44), Max: 98.5 (27 Aug 2021 04:34)  HR: 71 (27 Aug 2021 15:44) (66 - 72)  BP: 147/73 (27 Aug 2021 15:44) (133/76 - 159/78)  BP(mean): 105 (27 Aug 2021 11:32) (95 - 105)  RR: 16 (27 Aug 2021 15:44) (16 - 18)  SpO2: 99% (27 Aug 2021 15:44) (97% - 99%)    PHYSICAL EXAM:      Constitutional: NAD, well-groomed, well-developed  HEENT: PERRLA, EOMI, no exophalmos  Neck: No LAD, No JVD, trachea midline, no thyroid enlargement  Back: Normal spine flexure, No CVA tenderness  Respiratory: CTAB  Cardiovascular: S1 and S2, RRR, no M/G/R  Gastrointestinal: BS+, soft, no organomeglag or mass  Extremities: No peripheral edema, no pedal lesions          LABS:                        11.5   7.66  )-----------( 289      ( 27 Aug 2021 06:40 )             34.1     08-27    136  |  102  |  8.4  ----------------------------<  76  3.7   |  24.0  |  0.63    Ca    10.8<H>      27 Aug 2021 06:40  Phos  2.4     08-27  Mg     1.6     08-27    TPro  5.6<L>  /  Alb  x   /  TBili  x   /  DBili  x   /  AST  x   /  ALT  x   /  AlkPhos  x   08-26          CAPILLARY BLOOD GLUCOSE      RADIOLOGY & ADDITIONAL TESTS:  
[facility] Division of Hospital Medicine    Chief Complaint:  Hypercalcemia     SUBJECTIVE / OVERNIGHT EVENTS:  No overnight events   Pt says he feels well  Asking about surgery     Patient denies chest pain, SOB, abd pain, N/V, fever, chills, dysuria or any other complaints. All remainder ROS negative.     MEDICATIONS  (STANDING):  amLODIPine   Tablet 5 milliGRAM(s) Oral daily  cinacalcet 90 milliGRAM(s) Oral daily  heparin   Injectable 5000 Unit(s) SubCutaneous every 8 hours  losartan 50 milliGRAM(s) Oral daily  magnesium oxide 400 milliGRAM(s) Oral daily  simvastatin 20 milliGRAM(s) Oral at bedtime    MEDICATIONS  (PRN):  acetaminophen   Tablet .. 650 milliGRAM(s) Oral every 6 hours PRN Temp greater or equal to 38.5C (101.3F), Mild Pain (1 - 3)  aluminum hydroxide/magnesium hydroxide/simethicone Suspension 30 milliLiter(s) Oral every 4 hours PRN Dyspepsia  melatonin 3 milliGRAM(s) Oral at bedtime PRN Insomnia  ondansetron Injectable 4 milliGRAM(s) IV Push every 8 hours PRN Nausea and/or Vomiting        I&O's Summary      PHYSICAL EXAM:  Vital Signs Last 24 Hrs  T(C): 36.4 (27 Aug 2021 15:44), Max: 36.9 (27 Aug 2021 04:34)  T(F): 97.6 (27 Aug 2021 15:44), Max: 98.5 (27 Aug 2021 04:34)  HR: 71 (27 Aug 2021 15:44) (66 - 72)  BP: 147/73 (27 Aug 2021 15:44) (133/76 - 159/78)  BP(mean): 105 (27 Aug 2021 11:32) (95 - 105)  RR: 16 (27 Aug 2021 15:44) (16 - 18)  SpO2: 99% (27 Aug 2021 15:44) (97% - 99%)        CONSTITUTIONAL: NAD  ENMT: Moist oral mucosa  RESPIRATORY: Normal respiratory effort; lungs are clear to auscultation bilaterally  CARDIOVASCULAR: Regular rate and rhythm, normal S1 and S2, No lower extremity edema  ABDOMEN: Nontender to palpation  MUSCULOSKELETAL: No clubbing or cyanosis of digits  PSYCH: A+O to person, place, and time; affect appropriate  NEUROLOGY: No gross sensory or motor deficits   SKIN: No rashes      LABS:                        11.5   7.66  )-----------( 289      ( 27 Aug 2021 06:40 )             34.1     08-27    136  |  102  |  8.4  ----------------------------<  76  3.7   |  24.0  |  0.63    Ca    10.8<H>      27 Aug 2021 06:40  Phos  2.4     08-27  Mg     1.6     08-27    TPro  5.6<L>  /  Alb  x   /  TBili  x   /  DBili  x   /  AST  x   /  ALT  x   /  AlkPhos  x   08-26        
cc: hypercalcemia       interval hx : No overnight events . feels comfortable. in no acute distress. no fever or chills.         Vital Signs Last 24 Hrs  T(C): 36.4 (28 Aug 2021 16:58), Max: 36.6 (28 Aug 2021 04:38)  T(F): 97.5 (28 Aug 2021 16:58), Max: 97.8 (28 Aug 2021 04:38)  HR: 82 (28 Aug 2021 16:58) (77 - 102)  BP: 150/75 (28 Aug 2021 16:58) (131/79 - 158/82)  BP(mean): --  RR: 19 (28 Aug 2021 16:58) (16 - 19)  SpO2: 98% (28 Aug 2021 16:58) (98% - 100%)    gen :  NAD  heent :  Moist oral mucosa  resp: Normal respiratory effort; lungs are clear to auscultation bilaterally  cvs : Regular rate and rhythm, normal S1 and S2, No lower extremity edema  abd : Nontender to palpation  musk: No clubbing or cyanosis of digits  psych : A+O to person, place, and time;x 3 affect appropriate  neuro: : No gross sensory or motor deficits   skin : No rashes                          12.0   7.01  )-----------( 288      ( 28 Aug 2021 07:54 )             34.7   08-28    136  |  101  |  11.8  ----------------------------<  78  3.3<L>   |  25.0  |  0.63    Ca    11.4<H>      28 Aug 2021 07:54  Phos  2.4     08-27  Mg     1.6     08-27    MEDICATIONS  (STANDING):  amLODIPine   Tablet 5 milliGRAM(s) Oral daily  cinacalcet 90 milliGRAM(s) Oral daily  furosemide   Injectable 20 milliGRAM(s) IV Push daily  heparin   Injectable 5000 Unit(s) SubCutaneous every 8 hours  losartan 50 milliGRAM(s) Oral daily  magnesium oxide 400 milliGRAM(s) Oral daily  simvastatin 20 milliGRAM(s) Oral at bedtime  sodium chloride 0.9%. 1000 milliLiter(s) (100 mL/Hr) IV Continuous <Continuous>    MEDICATIONS  (PRN):  acetaminophen   Tablet .. 650 milliGRAM(s) Oral every 6 hours PRN Temp greater or equal to 38.5C (101.3F), Mild Pain (1 - 3)  aluminum hydroxide/magnesium hydroxide/simethicone Suspension 30 milliLiter(s) Oral every 4 hours PRN Dyspepsia  melatonin 3 milliGRAM(s) Oral at bedtime PRN Insomnia  ondansetron Injectable 4 milliGRAM(s) IV Push every 8 hours PRN Nausea and/or Vomiting  
[facility] Division of Hospital Medicine    Chief Complaint:  Hypercalcemia     SUBJECTIVE / OVERNIGHT EVENTS:  No overnight events   Pt says he feels well  Says that he has been doing a lot of thinking and now wants to pursue surgery     Patient denies chest pain, SOB, abd pain, N/V, fever, chills, dysuria or any other complaints. All remainder ROS negative.     MEDICATIONS  (STANDING):  amLODIPine   Tablet 5 milliGRAM(s) Oral daily  cinacalcet 90 milliGRAM(s) Oral daily  heparin   Injectable 5000 Unit(s) SubCutaneous every 8 hours  losartan 50 milliGRAM(s) Oral daily  magnesium oxide 400 milliGRAM(s) Oral daily  simvastatin 20 milliGRAM(s) Oral at bedtime  sodium chloride 0.9%. 1000 milliLiter(s) (100 mL/Hr) IV Continuous <Continuous>    MEDICATIONS  (PRN):  acetaminophen   Tablet .. 650 milliGRAM(s) Oral every 6 hours PRN Temp greater or equal to 38.5C (101.3F), Mild Pain (1 - 3)  aluminum hydroxide/magnesium hydroxide/simethicone Suspension 30 milliLiter(s) Oral every 4 hours PRN Dyspepsia  melatonin 3 milliGRAM(s) Oral at bedtime PRN Insomnia  ondansetron Injectable 4 milliGRAM(s) IV Push every 8 hours PRN Nausea and/or Vomiting        I&O's Summary      PHYSICAL EXAM:  Vital Signs Last 24 Hrs  T(C): 37 (26 Aug 2021 12:52), Max: 37 (26 Aug 2021 05:13)  T(F): 98.6 (26 Aug 2021 12:52), Max: 98.6 (26 Aug 2021 05:13)  HR: 77 (26 Aug 2021 12:52) (61 - 77)  BP: 144/61 (26 Aug 2021 12:52) (130/63 - 149/69)  BP(mean): --  RR: 18 (26 Aug 2021 12:52) (18 - 18)  SpO2: 99% (26 Aug 2021 12:52) (97% - 100%)      CONSTITUTIONAL: NAD  ENMT: Moist oral mucosa  RESPIRATORY: Normal respiratory effort; lungs are clear to auscultation bilaterally  CARDIOVASCULAR: Regular rate and rhythm, normal S1 and S2, No lower extremity edema  ABDOMEN: Nontender to palpation  MUSCULOSKELETAL: No clubbing or cyanosis of digits  PSYCH: A+O to person, place, and time; affect appropriate  NEUROLOGY: No gross sensory or motor deficits   SKIN: No rashes    LABS:                        11.0   7.12  )-----------( 262      ( 26 Aug 2021 06:59 )             32.4     08-26    135  |  103  |  6.9<L>  ----------------------------<  81  4.0   |  27.0  |  0.50    Ca    10.2      26 Aug 2021 06:59  Phos  1.1     08-26  Mg     1.4     08-26    TPro  6.5  /  Alb  x   /  TBili  x   /  DBili  x   /  AST  x   /  ALT  x   /  AlkPhos  x   08-25

## 2021-08-29 NOTE — PROGRESS NOTE ADULT - ASSESSMENT
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     Hypercalcemia  Elevated ionized calcium  PHPT  - Ca 13 -> 10.2 today  - Etiology unclear, need to rule out malignancy given weight loss   - C/w tele   - c/w Calcitonin   - Cinacalcet dose increased per Endo   - c/w IVF   - Hold Vit D   - Hold HCTZ 12.5  - Appreciate Endocrinology consult   - CT chest ordered, to be done today   - CT A/P from PCP office report reviewed with Endo, unremarkable     Hypomagnesemia  Hypophosphatemia   - Replaced IV   - Recheck in AM  - Replace as needed     Weight loss  - SPEP, UPEP sent   - PSA pending     HLD  - simvastatin 20 mg    HTN  - c/w losartan 50 mg qd w parameters  - amlodipine 5 mg qd  - hold HCTZ      VTE  heparin 5000 units sq  
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     >Hypercalcemia/ Elevated ionized calcium and PHPT  - Ca 13 -> 10.8 today > Calcium, Total Serum: 11.4 mg/dL (08.28.21 @ 07:54)  - Etiology: Primary HPT but also need to rule out malignancy given weight loss   - C/w tele   - s/p Calcitonin , c/w Cinacalcet dose increased per Endo   - Hold Vit D,  Hold HCTZ 12.5, avoid thiazide diuretics   - Appreciate Endocrinology consult   - < from: CT Chest No Cont (08.26.21 @ 19:37) >Mild emphysema. Tiny calcified right lung granulomas.Cholelithiasis.  -CT A/P from PCP office report reviewed with Endo, unremarkable   - needs  op follow up with ENT and Heme/Onc on DC , needs 3 d neck imaging as op   - will resume on ivf and lasix low dose today as hypercalcemia trending up     >Weight loss  - High Kappa and lambda on immunoelectrophoresis and high PSA  - Needs Heme/Onc follow up as op       >Hypomagnesemia/Hypophosphatemia   - moniotor / replete  Improving      >HLD  - simvastatin 20 mg    >HTN  - c/w losartan 50 mg qd w parameters  - amlodipine 5 mg qd  - DC HCTZ      >VTE  heparin 5000 units sq  Dispo: If Ca stable off IVF in AM, can DC 
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     Hypercalcemia  Elevated ionized calcium  PHPT  - Ca 13 -> 12 today  - C/w tele   - c/w Calcitonin   - Cinacalcet dose increased per Endo   - c/w IVF   - calcium q 6 hrs w next check at 10:00  - Hold Vit D   - Hold HCTZ 12.5  - Appreciate Endocrinology consult   - CT chest ordered   - CT A/P from PCP office report reviewed with Endo, unremarkable     Hypomagnesemia  Hypophosphatemia   - Replaced   - Recheck in AM  - Replace as needed     Weight loss  - check SPEP, UPEP  - Check PSA     HLD  - simvastatin 20 mg    HTN  - c/w losartan 50 mg qd w parameters  - amlodipine 5 mg qd  - hold HCTZ      VTE  heparin 5000 units sq  
Primary hyperparathyroidism, with mild hypercalcemia, in need of eventual parathyroid exploration.  ADvised increasing sensipar to 60 bid, and avoiding diuresis and maintaining fluid intake. Outpatient follow up to be arranged.
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     Hypercalcemia  Elevated ionized calcium  PHPT  - Ca 13 -> 10.8 today  - Etiology: Primary HPT but also need to rule out malignancy given weight loss   - C/w tele   - s/p Calcitonin   - Cinacalcet dose increased per Endo   - DC IVF today and monitor Ca in AM   - Hold Vit D   - Hold HCTZ 12.5, avoid thiazide diuretics   - Appreciate Endocrinology consult   - CT chest reviewed here    - CT A/P from PCP office report reviewed with Endo, unremarkable   - Will need follow up with ENT and Heme/Onc on DC     Hypomagnesemia  Hypophosphatemia   - Improving      Weight loss  - High Kappa and lambda on immunoelectrophoresis and high PSA  - Needs Heme/Onc follow up on DC     HLD  - simvastatin 20 mg    HTN  - c/w losartan 50 mg qd w parameters  - amlodipine 5 mg qd  - DC HCTZ      VTE  heparin 5000 units sq  Dispo: If Ca stable off IVF in AM, can DC 
Hypercalcmeia due likely to Priamry HPT    serum calcium improving    cont  sensipar   finishing calitonin  dw pt  Hypercalcemia- told him hisheart coudl stop if his caclium gets too high- he will reconsider surgery   SPEP CE level in progres  s   check 24 hr urine calcium    Serum immunoelectrophoresis   as concern could there be possible hypercalcemia of malignancy      weight loss   lilkey due to poor oral intake - reivewd dietary chocies he can make - eat at least 2 meals per day and snacks  checkCT scan chest   CT Abdpelvis report- no hopkins clerk availabkle to scanin records I received Form PMD- will try tomorrow     cholelithiasis- pt without abd pain - but needs to follwoup with PMD lucía GI     nonobs renalk stone- pt thinks it ahs been there 
Likely Priamry HPT   await CT scan of chest    Pt tihnks he woudlioke surgery while here  - not sure ifpossibel as may need 4 D CTscan adn head and neck surgery eval   Calcium better at 10.2   Avoid HCTZ     await PSA 
Primary HPT- pt will need further outpt workup with 4D CT scan of parathyroid and ENT eval   cotn sensipar   hold IVF today and see if  calcium remains stablle  weight loss       inc Kappa and lambda on immunoelectrophoreisis- will need hematology eval  ? contributign to hypercalcmeia   pt also ahs known osteoporosis    old records scanned in by hopkins clerk + osteoporosis but pt refused tx      reveiwd supplement- pt can take Nepro supplemtn   not Ensure ( too highin calcium)       inc PSA- pt will need outpt followup with Urology     pt will also call PMD to get appt next week     HTN avoid thiazide diuretics

## 2021-08-29 NOTE — DISCHARGE NOTE NURSING/CASE MANAGEMENT/SOCIAL WORK - PATIENT PORTAL LINK FT
You can access the FollowMyHealth Patient Portal offered by [facility] by registering at the following website: [email or web address]. By joining [email or web address] FollowMyHealth portal, you will also be able to view your health information using other applications (apps) compatible with our system.

## 2021-08-29 NOTE — PROGRESS NOTE ADULT - REASON FOR ADMISSION
Hypercalcemia  Primary hyperparathryoid disease  weakness
Hypercalcemia  Primary hyperparathyroidism  weakness

## 2021-09-01 LAB
% ALBUMIN: 57.1 % — SIGNIFICANT CHANGE UP
% ALPHA 1: 5.6 % — SIGNIFICANT CHANGE UP
% ALPHA 2: 10.7 % — SIGNIFICANT CHANGE UP
% BETA: 11 % — SIGNIFICANT CHANGE UP
% GAMMA: 15.6 % — SIGNIFICANT CHANGE UP
ALBUMIN SERPL ELPH-MCNC: 3.7 G/DL — SIGNIFICANT CHANGE UP (ref 3.6–5.5)
ALBUMIN/GLOB SERPL ELPH: 1.3 RATIO — SIGNIFICANT CHANGE UP
ALPHA1 GLOB SERPL ELPH-MCNC: 0.4 G/DL — SIGNIFICANT CHANGE UP (ref 0.1–0.4)
ALPHA2 GLOB SERPL ELPH-MCNC: 0.7 G/DL — SIGNIFICANT CHANGE UP (ref 0.5–1)
B-GLOBULIN SERPL ELPH-MCNC: 0.7 G/DL — SIGNIFICANT CHANGE UP (ref 0.5–1)
GAMMA GLOBULIN: 1 G/DL — SIGNIFICANT CHANGE UP (ref 0.6–1.6)
PROT PATTERN SERPL ELPH-IMP: SIGNIFICANT CHANGE UP

## 2021-09-03 PROBLEM — E21.3 HYPERPARATHYROIDISM, UNSPECIFIED: Chronic | Status: ACTIVE | Noted: 2021-08-24

## 2021-09-03 LAB
% ALBUMIN: 57.3 % — SIGNIFICANT CHANGE UP
% ALPHA 1: 5.6 % — SIGNIFICANT CHANGE UP
% ALPHA 2: 11 % — SIGNIFICANT CHANGE UP
% BETA: 11.1 % — SIGNIFICANT CHANGE UP
% GAMMA, URINE: 11.5 % — SIGNIFICANT CHANGE UP
% GAMMA: 15 % — SIGNIFICANT CHANGE UP
ALBUMIN 24H MFR UR ELPH: 21.3 % — SIGNIFICANT CHANGE UP
ALBUMIN SERPL ELPH-MCNC: 3.2 G/DL — LOW (ref 3.6–5.5)
ALBUMIN/GLOB SERPL ELPH: 1.3 RATIO — SIGNIFICANT CHANGE UP
ALPHA1 GLOB 24H MFR UR ELPH: 17 % — SIGNIFICANT CHANGE UP
ALPHA1 GLOB SERPL ELPH-MCNC: 0.3 G/DL — SIGNIFICANT CHANGE UP (ref 0.1–0.4)
ALPHA2 GLOB 24H MFR UR ELPH: 14.3 % — SIGNIFICANT CHANGE UP
ALPHA2 GLOB SERPL ELPH-MCNC: 0.6 G/DL — SIGNIFICANT CHANGE UP (ref 0.5–1)
B-GLOBULIN 24H MFR UR ELPH: 35.9 % — SIGNIFICANT CHANGE UP
B-GLOBULIN SERPL ELPH-MCNC: 0.6 G/DL — SIGNIFICANT CHANGE UP (ref 0.5–1)
GAMMA GLOBULIN: 0.8 G/DL — SIGNIFICANT CHANGE UP (ref 0.6–1.6)
INTERPRETATION 24H UR IFE-IMP: SIGNIFICANT CHANGE UP
INTERPRETATION 24H UR IFE-IMP: SIGNIFICANT CHANGE UP
INTERPRETATION SERPL IFE-IMP: SIGNIFICANT CHANGE UP
M PROTEIN 24H UR ELPH-MRATE: PRESENT
PROT ?TM UR-MCNC: 22 MG/DL — HIGH (ref 0–12)
PROT PATTERN 24H UR ELPH-IMP: SIGNIFICANT CHANGE UP
PROT PATTERN SERPL ELPH-IMP: SIGNIFICANT CHANGE UP
URINE CREATININE CALCULATION: SIGNIFICANT CHANGE UP G/24 H (ref 1–2)

## 2021-09-05 NOTE — CDI QUERY NOTE - NSCDIOTHERTXTBX_GEN_ALL_CORE_HH
There is conflicting documentation regarding the patient's nutrition status.  The patient received a nutrition assessment by a Registered Dietician on  8/27/21.  The documentation of this assessment states: Severe protein-calorie malnutrition. At the same time, your chart documentation also states the patient is well nourished.  Can the nutrition diagnosis be clarified?    A.  Severe protein-calorie malnutrition as evidenced by (please also refer to the Dietician Assessment for further details)  B. Other (please specify)  C. Not clinically significant    Chart Documentation:    Consult Note Adult-Endocrinology Attending [Charted Location: [place] [id number]] [Authored: 24-Aug-2021 17:00]  General appearance: Well developed, well nourished.  Electronic Signatures:  Bing Joe)  (Signed 24-Aug-2021 22:22)      Dietitian Nutrition Risk Notification [Charted Location: [facility] 160 64] [Authored: 27-Aug-2021 13:57]  Upon Nutritional Assessment by the Registered Dietitian Your Patient was Determined to Meet Criteria/Has Evidence of the Following Diagnosis:	Severe protein-calorie malnutrition  Other Risk Factors	Underweight (BMI < 19)  Etiology-Basis	Chronic illness  Malnutrition Evaluation as Demonstrated by (Adults > 20 years of age)	Weight loss...  Loss of subcutaneous fat...  Loss of muscle...  Weight Loss	> 10% in 6 months  Body Fat (loss of subcutaneous fat)	Orbital...  Triceps...  Fat overlying ribcage...  Buccal...  Orbital Depletion is	severe  Triceps Depletion is	severe  Fat Overlying Ribcage Depletion is	severe  Buccal Depletion is	severe  Muscle Mass (Loss of Muscle)	Temples...  Clavicles...  Shoulders...  Scapula...  Temples Depletion is	severe  Clavicles Depletion is	severe  Shoulders Depletion is	severe      Discharge Note Provider [Charted Location: [facility] [id number]] [Authored: 27-Aug-2021 19:19]  Details of Malnutrition Diagnosis/Diagnoses	This patient has been assessed with a concern for Malnutrition and was treated during this hospitalization for the following Nutrition diagnosis/diagnoses:     -  08/27/2021: Severe protein-calorie malnutrition   -  08/27/2021: Underweight (BMI < 19)

## 2021-09-24 ENCOUNTER — APPOINTMENT (OUTPATIENT)
Dept: OTOLARYNGOLOGY | Facility: CLINIC | Age: 81
End: 2021-09-24
Payer: MEDICARE

## 2021-09-24 VITALS
SYSTOLIC BLOOD PRESSURE: 145 MMHG | HEIGHT: 70 IN | HEART RATE: 62 BPM | WEIGHT: 123 LBS | DIASTOLIC BLOOD PRESSURE: 75 MMHG | BODY MASS INDEX: 17.61 KG/M2

## 2021-09-24 DIAGNOSIS — E83.52 HYPERCALCEMIA: ICD-10-CM

## 2021-09-24 DIAGNOSIS — Z78.9 OTHER SPECIFIED HEALTH STATUS: ICD-10-CM

## 2021-09-24 DIAGNOSIS — Z86.79 PERSONAL HISTORY OF OTHER DISEASES OF THE CIRCULATORY SYSTEM: ICD-10-CM

## 2021-09-24 DIAGNOSIS — Z86.39 PERSONAL HISTORY OF OTHER ENDOCRINE, NUTRITIONAL AND METABOLIC DISEASE: ICD-10-CM

## 2021-09-24 PROCEDURE — 99204 OFFICE O/P NEW MOD 45 MIN: CPT

## 2021-09-24 RX ORDER — AMLODIPINE BESYLATE 5 MG/1
5 TABLET ORAL
Qty: 90 | Refills: 0 | Status: ACTIVE | COMMUNITY
Start: 2021-08-30

## 2021-09-24 RX ORDER — SIMVASTATIN 20 MG/1
20 TABLET, FILM COATED ORAL
Qty: 90 | Refills: 0 | Status: ACTIVE | COMMUNITY
Start: 2021-09-01

## 2021-09-24 RX ORDER — MULTIVIT-MIN/IRON/FOLIC ACID/K 18-600-40
400 CAPSULE ORAL
Refills: 0 | Status: ACTIVE | COMMUNITY

## 2021-09-24 NOTE — HISTORY OF PRESENT ILLNESS
[de-identified] : Referred by ED for hypercalcemia.  PT had CA 13.2 on admission to [facility] on 8/24/21.  PT was discharged on Aug 29th and the CA was 11.8.  PTH was 183 on 8/24/21.    No history of renal stones.  Pt has lost about 50 lbs over 10 months.  Denies bone pain.  \par Pt has not had a 4D scan.  \par Endocrinologist is Dr. Ellis.

## 2021-09-24 NOTE — CONSULT LETTER
[Dear  ___] : Dear  [unfilled], [Consult Letter:] : I had the pleasure of evaluating your patient, [unfilled]. [Please see my note below.] : Please see my note below. [Consult Closing:] : Thank you very much for allowing me to participate in the care of this patient.  If you have any questions, please do not hesitate to contact me. [Sincerely,] : Sincerely, [FreeTextEntry2] : Dr Sarah Ellis [FreeTextEntry3] : \par Ariel Braxton MD, FACS\par \par Otolaryngology-Head and Neck Surgery\par [employer] [facility]\par

## 2021-10-02 ENCOUNTER — APPOINTMENT (OUTPATIENT)
Dept: CT IMAGING | Facility: IMAGING CENTER | Age: 81
End: 2021-10-02
Payer: MEDICARE

## 2021-10-02 ENCOUNTER — OUTPATIENT (OUTPATIENT)
Dept: OUTPATIENT SERVICES | Facility: HOSPITAL | Age: 81
LOS: 1 days | End: 2021-10-02
Payer: COMMERCIAL

## 2021-10-02 DIAGNOSIS — E21.3 HYPERPARATHYROIDISM, UNSPECIFIED: ICD-10-CM

## 2021-10-02 DIAGNOSIS — E83.52 HYPERCALCEMIA: ICD-10-CM

## 2021-10-02 PROCEDURE — 70491 CT SOFT TISSUE NECK W/DYE: CPT | Mod: 26

## 2021-10-02 PROCEDURE — 70492 CT SFT TSUE NCK W/O & W/DYE: CPT

## 2021-10-02 PROCEDURE — 82565 ASSAY OF CREATININE: CPT

## 2021-10-08 ENCOUNTER — APPOINTMENT (OUTPATIENT)
Dept: OTOLARYNGOLOGY | Facility: CLINIC | Age: 81
End: 2021-10-08
Payer: MEDICARE

## 2021-10-08 ENCOUNTER — APPOINTMENT (OUTPATIENT)
Dept: OTOLARYNGOLOGY | Facility: CLINIC | Age: 81
End: 2021-10-08

## 2021-10-08 VITALS
HEIGHT: 70 IN | HEART RATE: 76 BPM | WEIGHT: 125 LBS | SYSTOLIC BLOOD PRESSURE: 148 MMHG | DIASTOLIC BLOOD PRESSURE: 72 MMHG | OXYGEN SATURATION: 98 % | BODY MASS INDEX: 17.9 KG/M2

## 2021-10-08 PROCEDURE — 99072 ADDL SUPL MATRL&STAF TM PHE: CPT

## 2021-10-08 PROCEDURE — 99214 OFFICE O/P EST MOD 30 MIN: CPT

## 2021-10-08 NOTE — HISTORY OF PRESENT ILLNESS
[de-identified] : Referred by ED for hypercalcemia. PT had CA 13.2 on admission to [facility] on 8/24/21. PT was discharged on Aug 29th and the CA was 11.8. PTH was 183 on 8/24/21. No history of renal stones. Pt has lost about 50 lbs over 10 months. Denies bone pain. \par Pt  had a 4D scan which showed a R inferior parathyroid.\par Complete review of systems which was performed during a previous encounter was reviewed with the patient and there are no changes except as stated in the HPI section.\par

## 2021-10-08 NOTE — CONSULT LETTER
[Dear  ___] : Dear  [unfilled], [Courtesy Letter:] : I had the pleasure of seeing your patient, [unfilled], in my office today. [Please see my note below.] : Please see my note below. [Consult Closing:] : Thank you very much for allowing me to participate in the care of this patient.  If you have any questions, please do not hesitate to contact me. [Sincerely,] : Sincerely, [FreeTextEntry2] : Dr Sarah Ellis [FreeTextEntry3] : \par Ariel Braxton MD, FACS\par \par Otolaryngology-Head and Neck Surgery\par [employer] at [facility]\par

## 2021-10-18 ENCOUNTER — OUTPATIENT (OUTPATIENT)
Dept: OUTPATIENT SERVICES | Facility: HOSPITAL | Age: 81
LOS: 1 days | End: 2021-10-18

## 2021-10-18 VITALS
RESPIRATION RATE: 16 BRPM | OXYGEN SATURATION: 96 % | WEIGHT: 119.05 LBS | SYSTOLIC BLOOD PRESSURE: 146 MMHG | HEART RATE: 84 BPM | TEMPERATURE: 98 F | DIASTOLIC BLOOD PRESSURE: 84 MMHG | HEIGHT: 70 IN

## 2021-10-18 DIAGNOSIS — E21.3 HYPERPARATHYROIDISM, UNSPECIFIED: ICD-10-CM

## 2021-10-18 DIAGNOSIS — Z78.9 OTHER SPECIFIED HEALTH STATUS: ICD-10-CM

## 2021-10-18 LAB
ANION GAP SERPL CALC-SCNC: 11 MMOL/L — SIGNIFICANT CHANGE UP (ref 7–14)
BUN SERPL-MCNC: 18 MG/DL — SIGNIFICANT CHANGE UP (ref 7–23)
CALCIUM SERPL-MCNC: 12.9 MG/DL — HIGH (ref 8.4–10.5)
CHLORIDE SERPL-SCNC: 99 MMOL/L — SIGNIFICANT CHANGE UP (ref 98–107)
CO2 SERPL-SCNC: 25 MMOL/L — SIGNIFICANT CHANGE UP (ref 22–31)
CREAT SERPL-MCNC: 0.95 MG/DL — SIGNIFICANT CHANGE UP (ref 0.5–1.3)
GLUCOSE SERPL-MCNC: 86 MG/DL — SIGNIFICANT CHANGE UP (ref 70–99)
HCT VFR BLD CALC: 33.3 % — LOW (ref 39–50)
HGB BLD-MCNC: 11.3 G/DL — LOW (ref 13–17)
MCHC RBC-ENTMCNC: 31.3 PG — SIGNIFICANT CHANGE UP (ref 27–34)
MCHC RBC-ENTMCNC: 33.9 GM/DL — SIGNIFICANT CHANGE UP (ref 32–36)
MCV RBC AUTO: 92.2 FL — SIGNIFICANT CHANGE UP (ref 80–100)
NRBC # BLD: 0 /100 WBCS — SIGNIFICANT CHANGE UP
NRBC # FLD: 0 K/UL — SIGNIFICANT CHANGE UP
PLATELET # BLD AUTO: 328 K/UL — SIGNIFICANT CHANGE UP (ref 150–400)
POTASSIUM SERPL-MCNC: 4.1 MMOL/L — SIGNIFICANT CHANGE UP (ref 3.5–5.3)
POTASSIUM SERPL-SCNC: 4.1 MMOL/L — SIGNIFICANT CHANGE UP (ref 3.5–5.3)
RBC # BLD: 3.61 M/UL — LOW (ref 4.2–5.8)
RBC # FLD: 14.3 % — SIGNIFICANT CHANGE UP (ref 10.3–14.5)
SODIUM SERPL-SCNC: 135 MMOL/L — SIGNIFICANT CHANGE UP (ref 135–145)
WBC # BLD: 6.42 K/UL — SIGNIFICANT CHANGE UP (ref 3.8–10.5)
WBC # FLD AUTO: 6.42 K/UL — SIGNIFICANT CHANGE UP (ref 3.8–10.5)

## 2021-10-18 RX ORDER — SODIUM CHLORIDE 9 MG/ML
1000 INJECTION, SOLUTION INTRAVENOUS
Refills: 0 | Status: DISCONTINUED | OUTPATIENT
Start: 2021-10-25 | End: 2021-11-08

## 2021-10-18 RX ORDER — MAGNESIUM OXIDE 400 MG ORAL TABLET 241.3 MG
1 TABLET ORAL
Qty: 0 | Refills: 0 | DISCHARGE

## 2021-10-18 RX ORDER — AMLODIPINE BESYLATE 2.5 MG/1
1 TABLET ORAL
Qty: 0 | Refills: 0 | DISCHARGE

## 2021-10-18 NOTE — H&P PST ADULT - NSANTHOSAYNRD_GEN_A_CORE
No. CORNELL screening performed.  STOP BANG Legend: 0-2 = LOW Risk; 3-4 = INTERMEDIATE Risk; 5-8 = HIGH Risk

## 2021-10-18 NOTE — H&P PST ADULT - NSICDXPASTMEDICALHX_GEN_ALL_CORE_FT
PAST MEDICAL HISTORY:  Enlarged prostate     History of hypercalcemia     Hyperlipidemia     Hyperparathyroidism     Hypertension

## 2021-10-18 NOTE — H&P PST ADULT - PROBLEM SELECTOR PLAN 2
Await medical evaluation with pcp as requested by surgeon and PAST NP since patient is a poor historian and accurate information warranted.  * Await hematology consult requested by pcp. Patient not sure why he went to the MD but is awaiting blood work results. NP unable to speak with the MD. PAST NP also requesting consult Await prearranged  medical evaluation with pcp as requested by surgeon and PAST NP since patient is a poor historian and accurate information warranted.  * Await hematology consult requested by pcp. Patient not sure why he went to the MD but is awaiting blood work results. He lost 50 pounds over 6 months for no apparent reason and is "going for a lot of testing." NP unable to speak with the MD to find out reason for consult. PAST NP also requesting consult

## 2021-10-18 NOTE — H&P PST ADULT - HISTORY OF PRESENT ILLNESS
This is an 80 y/o male who presents with elevated serum calcium levels. Work up revealed hyperparathyroidism. Scheduled for parathyroidectomy with parathyroid hormone assay This is an 80 y/o male who is a very poor historian. Patient presents with elevated serum calcium levels. Work up revealed hyperparathyroidism. Scheduled for parathyroidectomy with parathyroid hormone assay

## 2021-10-18 NOTE — H&P PST ADULT - OTHER CARE PROVIDERS
hematologist, Dr. Rex Manjarrez [phone] hematologist, Dr. Rex Manjarrez [phone] or [phone]

## 2021-10-18 NOTE — H&P PST ADULT - NSICDXPASTSURGICALHX_GEN_ALL_CORE_FT
PAST SURGICAL HISTORY:  Benign Lipomatous Tumor right shoulder    S/P Inguinal Hernia Repair right and left    S/P Tonsillectomy

## 2021-10-18 NOTE — H&P PST ADULT - PROBLEM SELECTOR PLAN 1
This is an 80 y/o male who is scheduled for hyperparathyroidism with parathyroid hormone assay on 10-25-21  * Instructed to speak with surgeon regarding covid testing  * Given preop instructions  * Instructed to take normal am dose of losartan and norvasc This is an 82 y/o male who is scheduled for hyperparathyroidism with parathyroid hormone assay on 10-25-21  * Instructed to speak with surgeon regarding covid testing  * Given preop instructions  * Instructed to take normal am dose of losartan and norvasc the am of surgery

## 2021-10-20 DIAGNOSIS — Z01.818 ENCOUNTER FOR OTHER PREPROCEDURAL EXAMINATION: ICD-10-CM

## 2021-10-22 ENCOUNTER — APPOINTMENT (OUTPATIENT)
Dept: DISASTER EMERGENCY | Facility: CLINIC | Age: 81
End: 2021-10-22

## 2021-10-24 ENCOUNTER — TRANSCRIPTION ENCOUNTER (OUTPATIENT)
Age: 81
End: 2021-10-24

## 2021-10-24 LAB — SARS-COV-2 N GENE NPH QL NAA+PROBE: NOT DETECTED

## 2021-10-25 ENCOUNTER — OUTPATIENT (OUTPATIENT)
Dept: OUTPATIENT SERVICES | Facility: HOSPITAL | Age: 81
LOS: 1 days | Discharge: HOME | End: 2021-10-25
Payer: MEDICARE

## 2021-10-25 ENCOUNTER — RESULT REVIEW (OUTPATIENT)
Age: 81
End: 2021-10-25

## 2021-10-25 ENCOUNTER — APPOINTMENT (OUTPATIENT)
Dept: OTOLARYNGOLOGY | Facility: HOSPITAL | Age: 81
End: 2021-10-25

## 2021-10-25 VITALS
OXYGEN SATURATION: 100 % | TEMPERATURE: 98 F | DIASTOLIC BLOOD PRESSURE: 64 MMHG | HEART RATE: 62 BPM | SYSTOLIC BLOOD PRESSURE: 127 MMHG | RESPIRATION RATE: 16 BRPM

## 2021-10-25 VITALS
OXYGEN SATURATION: 100 % | WEIGHT: 119.05 LBS | HEIGHT: 70 IN | TEMPERATURE: 98 F | RESPIRATION RATE: 17 BRPM | SYSTOLIC BLOOD PRESSURE: 162 MMHG | DIASTOLIC BLOOD PRESSURE: 89 MMHG | HEART RATE: 79 BPM

## 2021-10-25 DIAGNOSIS — E21.3 HYPERPARATHYROIDISM, UNSPECIFIED: ICD-10-CM

## 2021-10-25 LAB
PTH INTACT, INTRAOP SPECIMEN 2: SIGNIFICANT CHANGE UP
PTH INTACT, INTRAOP SPECIMEN 3: SIGNIFICANT CHANGE UP
PTH INTACT, INTRAOP TIMING 2: SIGNIFICANT CHANGE UP
PTH INTACT, INTRAOP TIMING 3: SIGNIFICANT CHANGE UP
PTH INTACT, INTRAOPERATIVE 2: 60 PG/ML — SIGNIFICANT CHANGE UP (ref 15–65)
PTH INTACT, INTRAOPERATIVE 3: 45 PG/ML — SIGNIFICANT CHANGE UP (ref 15–65)
PTH-INTACT IO % DIF SERPL: 211 PG/ML — HIGH (ref 15–65)

## 2021-10-25 PROCEDURE — 88305 TISSUE EXAM BY PATHOLOGIST: CPT | Mod: 26

## 2021-10-25 PROCEDURE — 88331 PATH CONSLTJ SURG 1 BLK 1SPC: CPT | Mod: 26

## 2021-10-25 PROCEDURE — 88334 PATH CONSLTJ SURG CYTO XM EA: CPT | Mod: 26,59

## 2021-10-25 PROCEDURE — 60500 EXPLORE PARATHYROID GLANDS: CPT

## 2021-10-25 RX ORDER — OXYCODONE HYDROCHLORIDE 5 MG/1
1 TABLET ORAL
Qty: 6 | Refills: 0
Start: 2021-10-25 | End: 2021-11-07

## 2021-10-25 RX ORDER — AMLODIPINE BESYLATE 2.5 MG/1
1 TABLET ORAL
Qty: 0 | Refills: 0 | DISCHARGE

## 2021-10-25 RX ORDER — CINACALCET 30 MG/1
1 TABLET, FILM COATED ORAL
Qty: 0 | Refills: 0 | DISCHARGE

## 2021-10-25 RX ORDER — CALCIUM CARBONATE 500(1250)
3 TABLET ORAL
Qty: 270 | Refills: 1
Start: 2021-10-25 | End: 2021-12-23

## 2021-10-25 RX ORDER — OXYCODONE HYDROCHLORIDE 5 MG/1
5 TABLET ORAL EVERY 6 HOURS
Refills: 0 | Status: DISCONTINUED | OUTPATIENT
Start: 2021-10-25 | End: 2021-10-25

## 2021-10-25 RX ORDER — ACETAMINOPHEN 500 MG
650 TABLET ORAL EVERY 6 HOURS
Refills: 0 | Status: DISCONTINUED | OUTPATIENT
Start: 2021-10-25 | End: 2021-11-08

## 2021-10-25 RX ORDER — SIMVASTATIN 20 MG/1
1 TABLET, FILM COATED ORAL
Qty: 0 | Refills: 0 | DISCHARGE

## 2021-10-25 RX ORDER — ACETAMINOPHEN 500 MG
2 TABLET ORAL
Qty: 0 | Refills: 0 | DISCHARGE

## 2021-10-25 NOTE — ASU DISCHARGE PLAN (ADULT/PEDIATRIC) - ASU DC SPECIAL INSTRUCTIONSFT
Surgical Site Care  Please leave the site of surgery clean and dry. The dressing will be removed in clinic during your clinic follow up.  After Surgery Instructions  Hygiene  Please do not shower or bathe your head for 48 hours. After please be gentle with the site of surgery. No scrubbing or rubbing. May let water and soap over surgical site  Diet  Return to your usual diet  Activity  No heavy lifting or strenuous activity for 2 weeks. You should resume casual activity.  Medications  Please resume your normal medications   Pain medications  For mild or moderate pain, please take over the counter tylenol as needed for pain  For severe pain, may take oxycodone  Follow up  Please follow up with Dr. Braxton . Please call the otolaryngology office at  to confirm your appointment Surgical Site Care  Calcium  please take calcium medication as directed. Dr Braxton will instruct you when to stop  Please leave the site of surgery clean and dry. The dressing will be removed in clinic during your clinic follow up.  After Surgery Instructions  Hygiene  Please do not shower or bathe your head for 48 hours. After please be gentle with the site of surgery. No scrubbing or rubbing. May let water and soap over surgical site  Diet  Return to your usual diet  Activity  No heavy lifting or strenuous activity for 2 weeks. You should resume casual activity.  Medications  Please resume your normal medications   Pain medications  For mild or moderate pain, please take over the counter tylenol as needed for pain  For severe pain, may take oxycodone  Follow up  Please follow up with Dr. Braxton . Please call the otolaryngology office at  to confirm your appointment

## 2021-10-25 NOTE — ASU DISCHARGE PLAN (ADULT/PEDIATRIC) - MODE OF TRANSPORTATION
To help us maintain social distancing to keep you and your loved ones safe during the COVID-19 pandemic, please schedule your laboratory visit in advance by calling and making an appointment.    Please call [phone] to schedule your appointment        Your Child's Health  15-Month-Old Visit      Blanche Wylie  July 22, 2020    Visit Vitals  Ht 32.5\" (82.6 cm)   Wt 11.3 kg   HC 48 cm (18.9\")   BMI 16.64 kg/m²     Weight: 25 lbs    YOUR CHILD’S 15 MONTH-OLD VISIT      Key points at this age…  • Blanche should continue to ride in a properly-installed car seat in the back seat. Correct use of a car seat is always critically important for your baby’s safety. For maximum safety, keep the seat rear facing until your child reaches the top height or weight limit allowed by the car seat .        • Lifelong nutritional habits begin now. Avoid starting unhealthy foods (fried fast food, sweets, chips, other bagged snacks) and sweetened drinks like juice and soda. If you do give juice, limit it to 4 ounces or less of 100% fruit juice daily.    • Take care of that gustavo smile! Brush twice daily with a tiny amount of regular (not baby) toothpaste. Avoid sugary and sticky foods as well as juice.       NUTRITION:    It is normal for a child’s appetite to go up and down quite a bit after one year of age. This is because the rate of growth is slowing down - it is normal! Let them work on their self-feeding skills (finger foods, using a spoon) even if they are messy and you don't think they are eating enough. Some days they will eat a lot, some days much less. The key is to offer them healthy food choices in small amounts. If they finish what you give them, they can have more. Most \"picky\" eaters still grow and gain weight normally! Overweight and obesity are serious health problems in our [place], and they often start in childhood. So don't push your child to eat more than they want and don’t give unhealthy  foods (fried fast food, sweets, chips and other bagged snacks) to them. Right now you are in charge of what's going into them - keep it healthy!    Milk and water are the healthiest drink choices for your child. The milk is important because it provides calcium for bone health; they should ideally get 16 to 20 ounces of milk each day.     Eating fruit is much healthier than drinking juice. Even \"natural\" juices have extra sugars that can lead to tooth decay and weight gain. Children between 1 and 3 years of age should have no more than 4 ounces of 100% fruit juice daily. Do not water it down in a bottle or sippy cup that they can carry around and drink from over an extended period of time; this frequent exposure to the sugars in juice (even if diluted with water) just increases their risk of tooth decay.      Keep avoiding hard, chunky or chewy foods that a child could choke on. Be careful when serving foods like fruits (especially grapes) and vegetables--make sure they are cut into small pieces that will not pose a choking risk.     Even when eating a very well balanced diet, children need some extra vitamin D. A liquid preparation of pure vitamin D (400 or 600 IU) or a multivitamin with iron drop is recommended.  (They are currently too young for a chewable vitamin because they could choke on those.)    Everything your child drinks at this age should be from a cup. If your child is not yet off the bottle, talk to your doctor about ways to work on this. Using the bottle at this age is only going to cause problems (nutritional, dental).      DEVELOPMENT:  Most children at this age will listen to a story and imitate what you do.  They may have just a few words (besides “mama” and “katelyn”) but will let you know what they want by pulling you towards something, grunting and pointing. They understand when you tell them to do simple things (although they might not always do it!). They may scribble if you give them a crayon  or marker. And they will start moving around faster and faster! Most will run and climb whenever they see an opportunity and really won’t be bothered by minor falls and bruises--watching a toddler requires constant attention!    They may bring a book to you to read--encourage this because books are a great way to work on their language. (You don’t even have to read the story--just point to pictures, talk about the story and encourage your child to say words.) Avoid the temptation to put them in front of the TV or to put a cell phone or pad in their hands--the American Academy of Pediatrics does not recommend any “screen time” before age 2 years.      DISCIPLINE:  Parents and older family members need to agree on rules about how to respond to your toddler when he or she does something dangerous or undesirable (like hitting or biting - those behaviors will not be cute as they grow older). Consistency is important. If all family members react to the child's behaviors in the same way, the child will soon learn which behaviors are more likely to earn them praise and smiles (positive attention).     Keep it simple at this age. Saying a firm “no”, redirecting the child to a different toy or activity or briefly ignoring them (as long as they are not getting into harm’s way) will work better than yelling or long explanations about why you want them to not do something. Long lectures may actually reinforce the undesired behavior because you are paying attention to your child when you are lecturing them--remember that your attention is what your child wants most from you!     Temper tantrums may start in the near future. Toddlers get frustrated easily because they realize there are so many things they want to be doing, but they just are not capable (or permitted!) to do them. If your child starts having tantrums, leave them in a safe place (like the center of a room) and walk away or turn your back.  Don't say anything until  they are done. Once they quiet down, don't lecture them (they don't get that!). Instead, start a new activity or grab a book - they need to focus on something new rather than dwell on what upset them. To make life easier (and reduce the number of rules in your home), set up your home to be as safe as possible. Put away dangerous and valuable or fragile items. If you have fewer things that are off-limits to children, there are fewer opportunities for them to get into trouble.    DENTAL CARE:   Establish a regular brushing routine twice a day, ideally after breakfast and before bed. You should be brushing twice a day with a tiny smear (the size of a grain of rice!) of regular (fluoridated) toothpaste (not baby toothpaste).  Many toddlers want to do this themselves--they may be enthusiastic about it, but they are going to miss spots! It’s important for a parent to get in there every time and make sure all the teeth have been cleaned.     Fluoride is very important for your child’s dental health.  In addition to brushing with fluoridated toothpaste, they should be drinking the tap (city) water (which is carefully monitored so it has the ideal amount of fluoride for dental health). If you have well water instead of a city water supply, however, you should have it tested for fluoride content to determine whether your child might need fluoride supplements.     SAFETY/ACCIDENT PREVENTION:    Car Safety:  An approved car seat in the back seat is required by [place] law. Your child is safest in a rear-facing convertible car seat right now: keep the seat in a rear-facing position until your child reaches the top height or weight limit allowed by the car seat . (Even if your child is tall and they have to keep their legs bent, they are still safer when rear facing.)    Poisoning: Keep all cleaning and chemical products safely stored out of sight and out of reach. (Check for what is under your bathroom sink as well  as your kitchen sink.) Keep purses (your own and ones belonging to visitors) out of reach of curious toddlers; they can quickly find medicines, cigarettes, and small objects to choke on!     Keep the original bottles and safety caps for all medicines, including vitamins; do not transfer medicines to non-child-proofed or unlabeled containers. Be especially careful when around older people because they may keep their medicines out in the open or in non-childproofed containers.     Is this number in your cell phone? Call the Poison Center at 1-165.455.2949 for any known or suspected poisoning.       Falls: Your toddler will start moving around faster and faster! Be aware of what they can run into (furniture with sharp edges) and fall down, out or off of (stairs, windows, stepstools). Keep freeman on stairs and window latches on upper-story windows.    Burns: A toddler’s independence puts them at risk for burns because they want to touch everything they see (pans on the stove or freshly out of the oven, any water faucet within reach, irons, curling irons). Keep your water heater at a maximum temperature of 120-125°F to prevent scald burns. Be very careful to keep hot items out of reach. Have a family fire safety plan, including regular smoke detector (and carbon monoxide detector) battery checks, working fire extinguishers, and escape routes.    Water Safety: Toddlers often love the water but they need to be very closely supervised around it (this means tubs, buckets, wading pools and toilets as well as pools and lakes!).Children can drown in just a couple inches of water, so never leave an infant or toddler alone in a tub. Also, do not rely on older children to properly supervise the younger ones. An adult should always be within arm’s reach whenever a young child is in or around water.     Most children are not developmentally ready for swimming lessons until ~4 years of age. Swim programs for younger children have not  been proven to prevent drowning, nor will life jackets and “swimmies” protect your child from drowning! Constant supervision by an adult who knows how to swim is critical. Permanent pools (in ground and above ground) should be fenced off (and locked), and wading pools should be drained when not in use. Keep large buckets empty and keep toilet seat lids down and secured with a safety lock if possible.     Smoke Exposure: Continue to protect your child from cigarette smoke. Exposure to smoke will increase their risk of asthma, ear infections, and respiratory infections (pneumonia). If you smoke and are ready to consider quitting, talk to your doctor. Nicotine replacement products can be very helpful in breaking this tough addiction.       SLEEP: Consistency is key to good sleep habits!  1.  Provide a consistent dinner time (ideally not immediately before bedtime).   2.  Provide a consistent bedtime--keep it the same for weeknights and weekdays.  3.  Avoid stimulating activity before bedtime, including scary or intense movies or TV shows and high-energy physical activity or play.   4. Provide a consistent and reassuring routine (bath, brushing teeth, song/story, sleep). Your child should always be put to bed in the same bed.  5. Your child should always be put to bed sleepy but awake.  6.  Provide a “transition” object.  This could be a pillow, favorite blanket, or stuffed animal. Don't give pacifiers or other items to suck on; also avoid items on strings and anything with small pieces that could be swallowed.  7.  Provide a consistent wake-up time (unless your child is ill and needs extra sleep).    8. Night waking may develop, even in children who have previously slept well. If your child wakes up at night, visit them briefly, but do not spend a lot of time or do a lot of talking or extended reassurance. Make sure they have their “transition” object so they can console themselves back to sleep.     SHOES:  Children  need shoes to protect their feet when they start walking outside. They don’t need special arches or  fashions.  Buy shoes that have a roomy fit and flat, flexible soles with nonskid bottoms. Inexpensive ones are okay--they outgrow them quickly!     SUN EXPOSURE:  Protect your child from direct sun as much as you can. Keep them in the shade as much as possible and use protective clothing (including hats and sunglasses) when you are out. Always use sunscreen when your child is going to be outside. Choose one labelled as “broad spectrum” (which means it protects against both UVA and UVB rays) and with an SPF of 15 to 30; apply it to your child’s skin at least 20 to 30 minutes before going out in the sun. Zinc oxide (or titanium dioxide) products are good for sensitive spots (nose, cheeks, ears, shoulders); these don’t “rub in” all the way, but kids often like the fun colors they come in!      MEDICATION FOR FEVER OR PAIN:   Acetaminophen liquid (e.g., Tylenol or Tempra) may be given every four hours as needed for pain or fever.  Be sure to check which product CONCENTRATION you are using.    INFANT Tylenol/Acetaminophen  Drops (160 mg/5 mL)    Child’s Weight: Dose:  18 - 23 pounds:   120 mg (3.75 mL (3/4 Teaspoon))  23 - 27 pounds:   160 mg (5.0 mL (1 Teaspoon))    CHILDREN’S Tylenol/Acetaminophen  (160 mg/5 mL)    Child’s Weight: Dose:  18 - 23 pounds:   120 mg (3.75 mL (3/4 Teaspoon))  23 - 27 pounds:   160 mg (5.0 mL (1 Teaspoon))    INFANT Ibuprofen (50 mg/1.25 ml) liquid (for example Advil or Motrin) may be given every 6 hours as needed for pain or fever.    Child’s Weight: Dose:  18 - 23 pounds:   75 mg (1.875 mL)    CHILDREN'S Ibuprofen (100 mg/5 mL) liquid (for example Advil or Motrin) may be given every 6 hours as needed for pain or fever.    Child’s Weight: Dose:  18 - 23 pounds:   75 mg (3.75 mL (3/4 Teaspoon))  24 - 35 pounds:   100 mg (5 mL (1Teaspoon)))    If Blanche is outside these weight ranges,  call your pediatrician's office for advice.    Most Recent Immunizations   Administered Date(s) Administered   • DTaP/HIB/IPV 2019   • Hep A, ped/adol, 2 dose 05/07/2020   • Hep B, Unspecified Formulation 2019   • Hep B, adolescent or pediatric 2019   • Influenza, injectable, quadrivalent, preservative-free 2019   • MMR 05/07/2020   • Pneumococcal Conjugate 13 valent 2019   • Rotavirus - pentavalent 2019   • Varicella 05/07/2020       If Blanche develops any of the following reactions within 72 hours after an immunization, notify your pediatrician by calling the pediatric phone nurse:  1. A temperature of 105 degrees or above  2. More than 3 hours of continuous crying  3. A shrill, high-pitched cry  4. A pale, limp spell  5. A seizure or fainting spell.  In this case, you should call 911 or go immediately to the emergency room.      NEXT VISIT: 18 MONTHS OF AGE    Thank you for entrusting your care to [facility].      Also, check out “Children’s Health” on the [facility] Blog for updates on timely topics regarding children’s health!   Wheelchair/Stroller

## 2021-10-25 NOTE — ASU DISCHARGE PLAN (ADULT/PEDIATRIC) - PAIN MANAGEMENT
Take over the counter pain medication/Prescriptions electronically submitted to pharmacy from [facility] ./Take over the counter pain medication/Prescriptions electronically submitted to pharmacy from [facility]

## 2021-10-27 PROBLEM — N40.0 BENIGN PROSTATIC HYPERPLASIA WITHOUT LOWER URINARY TRACT SYMPTOMS: Chronic | Status: ACTIVE | Noted: 2021-10-18

## 2021-10-27 LAB — SURGICAL PATHOLOGY STUDY: SIGNIFICANT CHANGE UP

## 2021-11-12 ENCOUNTER — APPOINTMENT (OUTPATIENT)
Dept: OTOLARYNGOLOGY | Facility: CLINIC | Age: 81
End: 2021-11-12
Payer: MEDICARE

## 2021-11-12 VITALS
WEIGHT: 125 LBS | HEIGHT: 70 IN | BODY MASS INDEX: 17.9 KG/M2 | DIASTOLIC BLOOD PRESSURE: 71 MMHG | SYSTOLIC BLOOD PRESSURE: 129 MMHG | HEART RATE: 74 BPM

## 2021-11-12 DIAGNOSIS — E21.3 HYPERPARATHYROIDISM, UNSPECIFIED: ICD-10-CM

## 2021-11-12 PROCEDURE — 99024 POSTOP FOLLOW-UP VISIT: CPT

## 2021-11-12 RX ORDER — CINACALCET 90 MG/1
90 TABLET ORAL
Qty: 90 | Refills: 0 | Status: COMPLETED | COMMUNITY
Start: 2021-09-21 | End: 2021-11-12

## 2021-11-12 RX ORDER — LOSARTAN POTASSIUM 100 MG/1
TABLET, FILM COATED ORAL
Refills: 0 | Status: ACTIVE | COMMUNITY

## 2021-11-12 RX ORDER — CALCIUM CARBONATE 500(1250)
500 TABLET ORAL
Refills: 0 | Status: ACTIVE | COMMUNITY

## 2021-11-12 RX ORDER — LOSARTAN POTASSIUM 50 MG/1
50 TABLET, FILM COATED ORAL
Qty: 90 | Refills: 0 | Status: COMPLETED | COMMUNITY
Start: 2021-09-01 | End: 2021-11-12

## 2021-11-12 RX ORDER — LOSARTAN POTASSIUM AND HYDROCHLOROTHIAZIDE 12.5; 5 MG/1; MG/1
50-12.5 TABLET ORAL
Qty: 90 | Refills: 0 | Status: COMPLETED | COMMUNITY
Start: 2021-04-19 | End: 2021-11-12

## 2021-11-12 NOTE — HISTORY OF PRESENT ILLNESS
[de-identified] : Referred by ED for hypercalcemia. PT had CA 13.2 on admission to [facility] on 8/24/21. PT was discharged on Aug 29th and the CA was 11.8. PTH was 183 on 8/24/21. No history of renal stones. Pt has lost about 50 lbs over 10 months. Denies bone pain. \par Pt  had a 4D scan which showed a R inferior parathyroid.\par s/p R parathyroidectomy on 10/25/21.  Pt had labs drawn from Dr. Ellis two weeks ago. Pt to see Dr. Ellis and has an appointment scheduled for this month.   \par Complete review of systems which was performed during a previous encounter was reviewed with the patient and there are no changes except as stated in the HPI section.\par

## 2021-11-12 NOTE — CONSULT LETTER
[Dear  ___] : Dear  [unfilled], [Courtesy Letter:] : I had the pleasure of seeing your patient, [unfilled], in my office today. [Please see my note below.] : Please see my note below. [Consult Closing:] : Thank you very much for allowing me to participate in the care of this patient.  If you have any questions, please do not hesitate to contact me. [Sincerely,] : Sincerely, [FreeTextEntry2] : Dr Sarah Ellis [FreeTextEntry3] : \par Ariel Braxton MD, FACS\par \par Otolaryngology-Head and Neck Surgery\par [employer] [facility]\par

## 2022-05-13 ENCOUNTER — APPOINTMENT (OUTPATIENT)
Dept: OTOLARYNGOLOGY | Facility: CLINIC | Age: 82
End: 2022-05-13

## 2023-01-21 NOTE — DISCHARGE NOTE PROVIDER - NSDCDCMDCOMP_GEN_ALL_CORE
Medical Assessment Completed on: 21-Jan-2023 22:20
This document is complete and the patient is ready for discharge.

## 2023-03-20 NOTE — ED ADULT NURSE NOTE - ISOLATION TYPE:
----- Message from Jair Galvan MD sent at 3/20/2023 10:12 AM CDT -----  Please notify pt that he does not have uti  
Patient notified of results.  
None

## 2023-09-14 NOTE — H&P PST ADULT - NEGATIVE CARDIOVASCULAR SYMPTOMS
42-year-old patient with prior history of bleeding from peptic ulcer disease in stomach and duodenum, presented with upper abdominal pain, coffee-ground emesis  -CT of the abdomen and pelvis with contrast showed signs of duodenitis, no evidence of active bleeding  -Hemoglobin 8.5, with repeat 7.7, then 7.9  -EGD done per GI showed: incompletely healed duodenal ulcer, biopsy taken. Esophagus normal.  H. pylori negative  -Continue oral omeprazole 40mg twice daily  -Monitor H&H, transfuse for hemoglobin 7.0 and below  -Follow up with GI as outpatient  -Patient was given scheduled acetaminophen 1 g 4 times daily  -Sucralfate was added  -GI cocktail was added to help control pain   no chest pain/no palpitations/no dyspnea on exertion

## 2024-01-01 NOTE — ED ADULT NURSE NOTE - NSFALLRSKPASTHIST_ED_ALL_ED
You can access the FollowMyHealth Patient Portal offered by [facility] by registering at the following website: [email or web address]. By joining [email or web address] FollowMyHealth portal, you will also be able to view your health information using other applications (apps) compatible with our system.
no

## 2025-06-30 NOTE — H&P PST ADULT - NEGATIVE HEMATOLOGY SYMPTOMS
[facility] Call Center    Phone Message    May a detailed message be left on voicemail: yes     Reason for Call: Medication Refill Request    Has the patient contacted the pharmacy for the refill? Yes   Name of medication being requested: vibegron (GEMTESA) 75 MG TABS tablet [[id number]] (Order [id number])   Provider who prescribed the medication: Davin  [facility]  Date medication is needed: asap pt is out       Action Taken: Other: urology    Travel Screening: Not Applicable     Date of Service:                                                                      no gum bleeding/no nose bleeding